# Patient Record
Sex: MALE | Race: WHITE
[De-identification: names, ages, dates, MRNs, and addresses within clinical notes are randomized per-mention and may not be internally consistent; named-entity substitution may affect disease eponyms.]

---

## 2017-01-25 ENCOUNTER — HOSPITAL ENCOUNTER (INPATIENT)
Age: 82
LOS: 2 days | Discharge: HOME | DRG: 637 | End: 2017-01-27
Payer: MEDICARE

## 2017-01-25 ENCOUNTER — HOSPITAL ENCOUNTER (OUTPATIENT)
Age: 82
Discharge: ANOTHER HOSPITAL | End: 2017-01-25
Payer: MEDICARE

## 2017-01-25 DIAGNOSIS — I48.91: ICD-10-CM

## 2017-01-25 DIAGNOSIS — I70.1: ICD-10-CM

## 2017-01-25 DIAGNOSIS — I10: ICD-10-CM

## 2017-01-25 DIAGNOSIS — E78.5: ICD-10-CM

## 2017-01-25 DIAGNOSIS — N28.9: ICD-10-CM

## 2017-01-25 DIAGNOSIS — G93.41: ICD-10-CM

## 2017-01-25 DIAGNOSIS — Z79.01: ICD-10-CM

## 2017-01-25 DIAGNOSIS — I48.2: ICD-10-CM

## 2017-01-25 DIAGNOSIS — Z66: ICD-10-CM

## 2017-01-25 DIAGNOSIS — Z95.828: ICD-10-CM

## 2017-01-25 DIAGNOSIS — Z72.0: ICD-10-CM

## 2017-01-25 DIAGNOSIS — J44.9: ICD-10-CM

## 2017-01-25 DIAGNOSIS — Z99.81: ICD-10-CM

## 2017-01-25 DIAGNOSIS — J98.4: ICD-10-CM

## 2017-01-25 DIAGNOSIS — G47.33: ICD-10-CM

## 2017-01-25 DIAGNOSIS — I25.10: ICD-10-CM

## 2017-01-25 DIAGNOSIS — I65.29: ICD-10-CM

## 2017-01-25 DIAGNOSIS — J96.10: ICD-10-CM

## 2017-01-25 DIAGNOSIS — Z91.81: ICD-10-CM

## 2017-01-25 DIAGNOSIS — Z71.6: ICD-10-CM

## 2017-01-25 DIAGNOSIS — N40.0: ICD-10-CM

## 2017-01-25 DIAGNOSIS — Z95.5: ICD-10-CM

## 2017-01-25 DIAGNOSIS — I73.9: ICD-10-CM

## 2017-01-25 DIAGNOSIS — I25.2: ICD-10-CM

## 2017-01-25 DIAGNOSIS — Z79.899: ICD-10-CM

## 2017-01-25 DIAGNOSIS — N17.9: ICD-10-CM

## 2017-01-25 DIAGNOSIS — E11.01: Primary | ICD-10-CM

## 2020-03-03 ENCOUNTER — HOSPITAL ENCOUNTER (OUTPATIENT)
Dept: HOSPITAL 76 - RT | Age: 85
Discharge: HOME | End: 2020-03-03
Attending: STUDENT IN AN ORGANIZED HEALTH CARE EDUCATION/TRAINING PROGRAM
Payer: MEDICARE

## 2020-03-03 DIAGNOSIS — F17.290: ICD-10-CM

## 2020-03-03 DIAGNOSIS — J44.9: Primary | ICD-10-CM

## 2022-05-09 ENCOUNTER — HOSPITAL ENCOUNTER (INPATIENT)
Dept: HOSPITAL 76 - ED | Age: 87
LOS: 3 days | Discharge: HOME HEALTH | DRG: 177 | End: 2022-05-12
Attending: INTERNAL MEDICINE | Admitting: INTERNAL MEDICINE
Payer: MEDICARE

## 2022-05-09 ENCOUNTER — HOSPITAL ENCOUNTER (OUTPATIENT)
Dept: HOSPITAL 76 - EMS | Age: 87
Discharge: ANOTHER HOSPITAL | End: 2022-05-09
Payer: MEDICARE

## 2022-05-09 DIAGNOSIS — E78.5: ICD-10-CM

## 2022-05-09 DIAGNOSIS — N40.0: ICD-10-CM

## 2022-05-09 DIAGNOSIS — T44.7X6A: ICD-10-CM

## 2022-05-09 DIAGNOSIS — Z79.4: ICD-10-CM

## 2022-05-09 DIAGNOSIS — I11.0: ICD-10-CM

## 2022-05-09 DIAGNOSIS — Z95.5: ICD-10-CM

## 2022-05-09 DIAGNOSIS — R41.89: ICD-10-CM

## 2022-05-09 DIAGNOSIS — R63.0: ICD-10-CM

## 2022-05-09 DIAGNOSIS — R53.1: Primary | ICD-10-CM

## 2022-05-09 DIAGNOSIS — Z91.128: ICD-10-CM

## 2022-05-09 DIAGNOSIS — E87.6: ICD-10-CM

## 2022-05-09 DIAGNOSIS — I48.91: Primary | ICD-10-CM

## 2022-05-09 DIAGNOSIS — I48.20: ICD-10-CM

## 2022-05-09 DIAGNOSIS — Z99.81: ICD-10-CM

## 2022-05-09 DIAGNOSIS — E11.51: ICD-10-CM

## 2022-05-09 DIAGNOSIS — Z79.01: ICD-10-CM

## 2022-05-09 DIAGNOSIS — E83.42: ICD-10-CM

## 2022-05-09 DIAGNOSIS — R60.0: ICD-10-CM

## 2022-05-09 DIAGNOSIS — R53.83: ICD-10-CM

## 2022-05-09 DIAGNOSIS — I25.10: ICD-10-CM

## 2022-05-09 DIAGNOSIS — I50.9: ICD-10-CM

## 2022-05-09 DIAGNOSIS — F03.90: ICD-10-CM

## 2022-05-09 DIAGNOSIS — Z28.310: ICD-10-CM

## 2022-05-09 DIAGNOSIS — I65.29: ICD-10-CM

## 2022-05-09 DIAGNOSIS — R09.02: ICD-10-CM

## 2022-05-09 DIAGNOSIS — F17.210: ICD-10-CM

## 2022-05-09 DIAGNOSIS — J44.1: ICD-10-CM

## 2022-05-09 DIAGNOSIS — K21.9: ICD-10-CM

## 2022-05-09 DIAGNOSIS — I13.0: ICD-10-CM

## 2022-05-09 DIAGNOSIS — N17.9: ICD-10-CM

## 2022-05-09 DIAGNOSIS — M10.9: ICD-10-CM

## 2022-05-09 DIAGNOSIS — J12.82: ICD-10-CM

## 2022-05-09 DIAGNOSIS — R94.6: ICD-10-CM

## 2022-05-09 DIAGNOSIS — R05.9: ICD-10-CM

## 2022-05-09 DIAGNOSIS — G47.33: ICD-10-CM

## 2022-05-09 DIAGNOSIS — N18.9: ICD-10-CM

## 2022-05-09 DIAGNOSIS — U07.1: ICD-10-CM

## 2022-05-09 DIAGNOSIS — Z66: ICD-10-CM

## 2022-05-09 DIAGNOSIS — J44.0: ICD-10-CM

## 2022-05-09 DIAGNOSIS — I25.2: ICD-10-CM

## 2022-05-09 DIAGNOSIS — J18.9: ICD-10-CM

## 2022-05-09 DIAGNOSIS — G47.30: ICD-10-CM

## 2022-05-09 DIAGNOSIS — R06.09: ICD-10-CM

## 2022-05-09 DIAGNOSIS — E11.22: ICD-10-CM

## 2022-05-09 LAB
ALBUMIN DIAFP-MCNC: 2.7 G/DL (ref 3.2–5.5)
ALBUMIN/GLOB SERPL: 0.6 {RATIO} (ref 1–2.2)
ALP SERPL-CCNC: 178 IU/L (ref 42–121)
ALT SERPL W P-5'-P-CCNC: 20 IU/L (ref 10–60)
ANION GAP SERPL CALCULATED.4IONS-SCNC: 12 MMOL/L (ref 6–13)
AST SERPL W P-5'-P-CCNC: 22 IU/L (ref 10–42)
B PARAPERT DNA SPEC QL NAA+PROBE: NOT DETECTED
B PERT DNA SPEC QL NAA+PROBE: NOT DETECTED
BASE EXCESS BLDV CALC-SCNC: 7 MMOL/L
BASOPHILS NFR BLD AUTO: 0 10^3/UL (ref 0–0.1)
BASOPHILS NFR BLD AUTO: 0.3 %
BILIRUB BLD-MCNC: 1.4 MG/DL (ref 0.2–1)
BUN SERPL-MCNC: 17 MG/DL (ref 6–20)
C PNEUM DNA NPH QL NAA+NON-PROBE: NOT DETECTED
CALCIUM UR-MCNC: 8.6 MG/DL (ref 8.5–10.3)
CHLORIDE SERPL-SCNC: 97 MMOL/L (ref 101–111)
CO2 BLDV-SCNC: 35.3 MMOL/L (ref 24–29)
CO2 SERPL-SCNC: 31 MMOL/L (ref 21–32)
CREAT SERPLBLD-SCNC: 1.6 MG/DL (ref 0.6–1.2)
DIGOXIN SERPL-MCNC: < 0.2 NG/ML
EOSINOPHIL # BLD AUTO: 0 10^3/UL (ref 0–0.7)
EOSINOPHIL NFR BLD AUTO: 0.3 %
ERYTHROCYTE [DISTWIDTH] IN BLOOD BY AUTOMATED COUNT: 13.8 % (ref 12–15)
FLUAV RNA RESP QL NAA+PROBE: NOT DETECTED
GFRSERPLBLD MDRD-ARVRAT: 41 ML/MIN/{1.73_M2} (ref 89–?)
GLOBULIN SER-MCNC: 4.2 G/DL (ref 2.1–4.2)
GLUCOSE SERPL-MCNC: 117 MG/DL (ref 70–100)
HAEM INFLU B DNA SPEC QL NAA+PROBE: NOT DETECTED
HCO3 BLDMV-SCNC: 33.6 MMOL/L (ref 23–28)
HCOV 229E RNA SPEC QL NAA+PROBE: NOT DETECTED
HCOV HKU1 RNA UPPER RESP QL NAA+PROBE: NOT DETECTED
HCOV NL63 RNA ASPIRATE QL NAA+PROBE: NOT DETECTED
HCOV OC43 RNA SPEC QL NAA+PROBE: NOT DETECTED
HCT VFR BLD AUTO: 42.6 % (ref 42–52)
HGB UR QL STRIP: 13.9 G/DL (ref 14–18)
HMPV AG SPEC QL: NOT DETECTED
HPIV1 RNA NPH QL NAA+PROBE: NOT DETECTED
HPIV2 SPEC QL CULT: NOT DETECTED
HPIV3 AB TITR SER CF: NOT DETECTED {TITER}
HPIV4 RNA SPEC QL NAA+PROBE: NOT DETECTED
INR PPP: 1.2 (ref 0.8–1.2)
LYMPHOCYTES # SPEC AUTO: 0.5 10^3/UL (ref 1.5–3.5)
LYMPHOCYTES NFR BLD AUTO: 7.2 %
M PNEUMO DNA SPEC QL NAA+PROBE: NOT DETECTED
MAGNESIUM SERPL-MCNC: 1.4 MG/DL (ref 1.7–2.8)
MCH RBC QN AUTO: 30.5 PG (ref 27–31)
MCHC RBC AUTO-ENTMCNC: 32.6 G/DL (ref 32–36)
MCV RBC AUTO: 93.4 FL (ref 80–94)
MONOCYTES # BLD AUTO: 0.6 10^3/UL (ref 0–1)
MONOCYTES NFR BLD AUTO: 8.6 %
NEUTROPHILS # BLD AUTO: 6.2 10^3/UL (ref 1.5–6.6)
NEUTROPHILS # SNV AUTO: 7.4 X10^3/UL (ref 4.8–10.8)
NEUTROPHILS NFR BLD AUTO: 83.2 %
NRBC # BLD AUTO: 0 /100WBC
NRBC # BLD AUTO: 0 X10^3/UL
PCO2 BLDV: 55.6 MMHG (ref 41–51)
PDW BLD AUTO: 10.7 FL (ref 7.4–11.4)
PH BLDV: 7.4 [PH] (ref 7.31–7.41)
PHOSPHATE BLD-MCNC: 2.9 MG/DL (ref 2.5–4.6)
PLATELET # BLD: 238 10^3/UL (ref 130–450)
PO2 BLDV: 19.9 MMHG (ref 25–47)
POTASSIUM SERPL-SCNC: 3.2 MMOL/L (ref 3.5–5)
PROT SPEC-MCNC: 6.9 G/DL (ref 6.7–8.2)
PROTHROM ACT/NOR PPP: 13.8 SECS (ref 9.9–12.6)
RBC MAR: 4.56 10^6/UL (ref 4.7–6.1)
RSV RNA RESP QL NAA+PROBE: NOT DETECTED
RV+EV RNA SPEC QL NAA+PROBE: NOT DETECTED
SAO2 DF BLDV: 34 % (ref 60–80)
SARS-COV-2 RNA PNL SPEC NAA+PROBE: DETECTED
SODIUM SERPLBLD-SCNC: 140 MMOL/L (ref 135–145)

## 2022-05-09 PROCEDURE — 87150 DNA/RNA AMPLIFIED PROBE: CPT

## 2022-05-09 PROCEDURE — 82803 BLOOD GASES ANY COMBINATION: CPT

## 2022-05-09 PROCEDURE — 82330 ASSAY OF CALCIUM: CPT

## 2022-05-09 PROCEDURE — 84100 ASSAY OF PHOSPHORUS: CPT

## 2022-05-09 PROCEDURE — 71275 CT ANGIOGRAPHY CHEST: CPT

## 2022-05-09 PROCEDURE — 94640 AIRWAY INHALATION TREATMENT: CPT

## 2022-05-09 PROCEDURE — 71045 X-RAY EXAM CHEST 1 VIEW: CPT

## 2022-05-09 PROCEDURE — 87633 RESP VIRUS 12-25 TARGETS: CPT

## 2022-05-09 PROCEDURE — 82746 ASSAY OF FOLIC ACID SERUM: CPT

## 2022-05-09 PROCEDURE — 83605 ASSAY OF LACTIC ACID: CPT

## 2022-05-09 PROCEDURE — 80048 BASIC METABOLIC PNL TOTAL CA: CPT

## 2022-05-09 PROCEDURE — 82607 VITAMIN B-12: CPT

## 2022-05-09 PROCEDURE — 83735 ASSAY OF MAGNESIUM: CPT

## 2022-05-09 PROCEDURE — 84480 ASSAY TRIIODOTHYRONINE (T3): CPT

## 2022-05-09 PROCEDURE — 83036 HEMOGLOBIN GLYCOSYLATED A1C: CPT

## 2022-05-09 PROCEDURE — 84439 ASSAY OF FREE THYROXINE: CPT

## 2022-05-09 PROCEDURE — 84443 ASSAY THYROID STIM HORMONE: CPT

## 2022-05-09 PROCEDURE — 97165 OT EVAL LOW COMPLEX 30 MIN: CPT

## 2022-05-09 PROCEDURE — 36415 COLL VENOUS BLD VENIPUNCTURE: CPT

## 2022-05-09 PROCEDURE — 85610 PROTHROMBIN TIME: CPT

## 2022-05-09 PROCEDURE — 93005 ELECTROCARDIOGRAM TRACING: CPT

## 2022-05-09 PROCEDURE — 96376 TX/PRO/DX INJ SAME DRUG ADON: CPT

## 2022-05-09 PROCEDURE — 97161 PT EVAL LOW COMPLEX 20 MIN: CPT

## 2022-05-09 PROCEDURE — 83880 ASSAY OF NATRIURETIC PEPTIDE: CPT

## 2022-05-09 PROCEDURE — 84484 ASSAY OF TROPONIN QUANT: CPT

## 2022-05-09 PROCEDURE — 99285 EMERGENCY DEPT VISIT HI MDM: CPT

## 2022-05-09 PROCEDURE — 85025 COMPLETE CBC W/AUTO DIFF WBC: CPT

## 2022-05-09 PROCEDURE — 94761 N-INVAS EAR/PLS OXIMETRY MLT: CPT

## 2022-05-09 PROCEDURE — 87040 BLOOD CULTURE FOR BACTERIA: CPT

## 2022-05-09 PROCEDURE — 80162 ASSAY OF DIGOXIN TOTAL: CPT

## 2022-05-09 PROCEDURE — 99291 CRITICAL CARE FIRST HOUR: CPT

## 2022-05-09 PROCEDURE — 93971 EXTREMITY STUDY: CPT

## 2022-05-09 PROCEDURE — 94664 DEMO&/EVAL PT USE INHALER: CPT

## 2022-05-09 PROCEDURE — 96365 THER/PROPH/DIAG IV INF INIT: CPT

## 2022-05-09 PROCEDURE — 96375 TX/PRO/DX INJ NEW DRUG ADDON: CPT

## 2022-05-09 PROCEDURE — 80053 COMPREHEN METABOLIC PANEL: CPT

## 2022-05-09 RX ADMIN — SODIUM CHLORIDE, PRESERVATIVE FREE PRN ML: 5 INJECTION INTRAVENOUS at 21:54

## 2022-05-09 RX ADMIN — LEVALBUTEROL SCH MG: 1.25 SOLUTION RESPIRATORY (INHALATION) at 22:40

## 2022-05-09 RX ADMIN — INSULIN ASPART SCH UNIT: 100 INJECTION, SOLUTION INTRAVENOUS; SUBCUTANEOUS at 22:15

## 2022-05-09 RX ADMIN — POTASSIUM CHLORIDE SCH MLS/HR: 7.46 INJECTION, SOLUTION INTRAVENOUS at 23:24

## 2022-05-09 RX ADMIN — FAMOTIDINE SCH MG: 20 TABLET, FILM COATED ORAL at 21:53

## 2022-05-09 RX ADMIN — METOPROLOL SUCCINATE SCH MG: 25 TABLET, EXTENDED RELEASE ORAL at 21:53

## 2022-05-09 NOTE — ULTRASOUND REPORT
PROCEDURE:  Duplex Ext Veins Right

 

INDICATIONS:  Leg swelling

 

TECHNIQUE:  

Real-time imaging, as well as color and pulse Doppler interrogation, were performed of the lower extr
emity deep veins from the inguinal ligament to the popliteal fossa.  

 

COMPARISON:  None.

 

FINDINGS:  The deep veins are normally compressible, and free of intraluminal thrombus.  Color and pu
lse Doppler demonstrate normal phasic intraluminal flow.  There is normal augmentation response to di
stal compression maneuver.  

 

IMPRESSION:  No sonographic evidence of DVT.

 

Reviewed by: Murphy Ramirez MD on 5/9/2022 7:23 PM PDT

Approved by: Murphy Ramirez MD on 5/9/2022 7:23 PM PDT

 

 

Station ID:  [id number]

## 2022-05-09 NOTE — XRAY REPORT
PROCEDURE:  Chest 1 View X-Ray

 

INDICATIONS:  dyspnea

 

TECHNIQUE:  One view of the chest was acquired.  

 

COMPARISON:  Chest x-ray one view, 1/25/2017.

 

FINDINGS:  

 

Surgical changes and devices:  None.  

 

Lungs and pleura:  Left basilar infiltrate and consolidation. Small left effusion is present. A densi
ty in the right upper lobe is likely caused by costochondral calcification of the first rib. No pneum
othorax.

 

Mediastinum:  Mediastinal contours appear normal.  Heart size is normal.  

 

Bones and chest wall:  No suspicious bony lesions.  Overlying soft tissues appear unremarkable.  

 

IMPRESSION:  

 

Left lower lobe infiltrate/consolidation with small left pleural effusions suspicious for left lower 
lobe pneumonia.

 

Reviewed by: Deanne Choi MD on 5/9/2022 5:39 PM [facility]

Approved by: Deanne Choi MD on 5/9/2022 5:39 PM [facility]

 

 

Station ID:  [id number]

## 2022-05-09 NOTE — ED PHYSICIAN DOCUMENTATION
History of Present Illness





- Stated complaint


Stated Complaint: SOA





- History obtained from


History obtained from: Patient, EMS





- Additonal information


Additional information: 





86-year-old gentleman presents by ambulance for the evaluation of shortness of 

breath.  He is a vague historian, says he is short of breath, just been coming 

and going over maybe days.  Says he has a mild cough, no chest pain.  Paramedics

state he has been noncompliant with his medications.  Patient certainly does not

know his medications but he says he takes about 4 in the morning and about 5 in 

the evening.  He usually uses oxygen at night for his COPD and when I mentioned 

Trelegy he unconvincingly stated that he had been using that as well.  On exam I

note that his right leg is swollen, he had not noted that.  He says he is 

smoking less lately than he used to, currently only about 1-1/2 packs a day.  He

has a history of A. fib, coronary disease, peripheral vascular disease, COPD, 

hyperlipidemia, hypertension, BPH, gout, and remote cholecystectomy.





Review of Systems


Ten Systems: 10 systems reviewed and negative


Constitutional: denies: Fever, Chills


Nose: denies: Rhinorrhea / runny nose, Congestion


Cardiac: denies: Chest pain / pressure, Palpitations


Respiratory: reports: Dyspnea, Cough





PD PAST MEDICAL HISTORY





- Past Medical History


Cardiovascular: Congestive heart failure, Hypertension, Coronary artery disease,

Peripheral Vascular Disease, MI, Atrial fibrillation


Respiratory: COPD, Sleep apnea, CPAP use


Endocrine/Autoimmune: Type 2 diabetes


GI: GERD, Ulcers, Hemorrhoids


: Incontinence


HEENT: Chronic hearing loss


Psych: None


Musculoskeletal: Other


Derm: None





- Past Surgical History


Past Surgical History: Yes


General: Cholecystectomy, Other


Cardiovascular: Coronary stent





- Present Medications


Home Medications: 


                                Ambulatory Orders











 Medication  Instructions  Recorded  Confirmed


 


Colchicine [Colcrys] 0.6 mg PO DAILY 01/25/17 01/26/17


 


Digoxin [Digox] 125 mcg PO DAILY 01/25/17 01/26/17


 


Doxazosin [Cardura] 4 mg PO DAILY 01/25/17 01/26/17


 


Esomeprazole Magnesium [Nexium] 20 mg PO DAILY 01/25/17 01/26/17


 


Furosemide 40 mg PO SUMOWETHSA 01/25/17 01/26/17


 


Metoprolol Succinate [Toprol Xl] 25 mg PO DAILY 01/25/17 01/26/17


 


Potassium Chloride [Klor-Con M20] 20 meq PO DAILY 01/25/17 01/26/17


 


Simvastatin 40 mg PO DAILY 01/25/17 01/26/17


 


Warfarin Sodium 5 mg PO MOTUWEFRSA 01/25/17 01/26/17


 


allopurinoL [Allopurinol] 300 mg PO DAILY 01/25/17 01/26/17


 


lisinopriL [Zestril] 20 mg PO DAILY 01/25/17 01/26/17


 


Furosemide [Lasix] 80 mg PO TUTH 01/26/17 01/26/17


 


Warfarin Sodium 7.5 mg PO SUTH 01/26/17 01/26/17


 


Blood Sugar Diagnostic [Glu[facility]eter [facility] strip 01/27/17 





Strips]   


 


Blood-Glucose Meter [Glucometer] 1 each [facility] #1 kit 01/27/17 


 


Insulin Aspart [NovoLOG] 5 unit SUBQ TIDWM #2 pen 01/27/17 


 


Insulin Glargine [Lantus Solostar] 25 unit SUBQ QDBREAKFAST #2 pen 01/27/17 


 


Needles, Disposable [Easy Touch 1 each [facility] dis.needle 01/27/17 





Hypodermic Needle]   














- Allergies


Allergies/Adverse Reactions: 


                                    Allergies











Allergy/AdvReac Type Severity Reaction Status Date / Time


 


ibuprofen Allergy  Hives Verified 05/09/22 18:20














- Social History


Does the pt smoke?: Yes


Smoking Status: Current every day smoker


Does the pt drink ETOH?: No


Does the pt have substance abuse?: No





- Immunizations


Immunizations: TDAP >10years/unknown





PD ED PE NORMAL





- Vitals


Vital signs reviewed: Yes





- General


General: Alert and oriented X 3, Other (Rapid pursed lip breathing, speaking in 

full sentences albeit barely.  Smells of tobacco.)





- HEENT


HEENT: PERRL, EOMI





- Neck


Neck: Supple, no meningeal sign, No bony TTP





- Cardiac


Cardiac: Other (Rapid and irregular without murmur)





- Respiratory


Respiratory: Other (Rapid breathing, diminished throughout)





- Abdomen


Abdomen: Normal bowel sounds, Soft, Non tender





- Back


Back: No CVA TTP, No spinal TTP





- Derm


Derm: Normal color, Warm and dry





- Extremities


Extremities: Other (Significant asymmetric edema of the right leg without 

tenderness)





- Neuro


Neuro: Alert and oriented X 3, Normal speech





Results





- Vitals


Vitals: 


                               Vital Signs - 24 hr











  05/09/22 05/09/22 05/09/22





  18:11 18:20 19:12


 


Temperature 37.3 C  


 


Heart Rate 119 H 111 H 107 H


 


Respiratory 28 H 27 H 28 H





Rate   


 


Blood Pressure 151/105 H 160/104 H 160/104 H


 


O2 Saturation 93 93 92














  05/09/22





  19:30


 


Temperature 


 


Heart Rate 106 H


 


Respiratory 24





Rate 


 


Blood Pressure 


 


O2 Saturation 








                                     Oxygen











O2 Source                      Nasal cannula


 


Oxygen Flow Rate               2

















- EKG (time done)


  ** 1910


Rate: Rate (enter#) (110)


Rhythm: Atrial fibrillation


Axis: Normal


Intervals: Prolonged QT


Ischemia: Non specific changes





- Labs


Labs: 


                                Laboratory Tests











  05/09/22 05/09/22 05/09/22





  18:26 18:26 18:26


 


WBC  7.4  


 


RBC  4.56 L  


 


Hgb  13.9 L  


 


Hct  42.6  


 


MCV  93.4  


 


MCH  30.5  


 


MCHC  32.6  


 


RDW  13.8  


 


Plt Count  238  


 


MPV  10.7  


 


Neut # (Auto)  6.2  


 


Lymph # (Auto)  0.5 L  


 


Mono # (Auto)  0.6  


 


Eos # (Auto)  0.0  


 


Baso # (Auto)  0.0  


 


Absolute Nucleated RBC  0.00  


 


Nucleated RBC %  0.0  


 


VBG pH   


 


VBG pCO2   


 


VBG pO2   


 


VBG HCO3   


 


VBG Total CO2   


 


VBG O2 Saturation   


 


VBG Base Excess   


 


Sodium   140 


 


Potassium   3.2 L 


 


Chloride   97 L 


 


Carbon Dioxide   31 


 


Anion Gap   12.0 


 


BUN   17 


 


Creatinine   1.6 H 


 


Estimated GFR (MDRD)   41 L 


 


Glucose   117 H 


 


Lactic Acid   


 


Calcium   8.6 


 


Phosphorus   2.9 


 


Magnesium   1.4 L 


 


Total Bilirubin   1.4 H 


 


AST   22 


 


ALT   20 


 


Alkaline Phosphatase   178 H 


 


Troponin I High Sens    47.7 H*


 


B-Natriuretic Peptide   


 


Total Protein   6.9 


 


Albumin   2.7 L 


 


Globulin   4.2 


 


Albumin/Globulin Ratio   0.6 L 


 


Nasal Adenovirus (PCR)   


 


Nasal B. parapertussis DNA (PCR)   


 


Nasal Coronavir 229E PCR   


 


Nasal Coronavir HKU1 PCR   


 


Nasal Coronavir NL63 PCR   


 


Nasal Coronavir OC43 PCR   


 


Nasal Enterovir/Rhinovir PCR   


 


Nasal Influenza B PCR   


 


Nasal Influenza A PCR   


 


Nasal Parainfluen 1 PCR   


 


Nasal Parainfluen 2 PCR   


 


Nasal Parainfluen 3 PCR   


 


Nasal Parainfluen 4 PCR   


 


Nasal RSV (PCR)   


 


Nasal B.pertussis DNA PCR   


 


Nasal C.pneumoniae (PCR)   


 


Ger Human Metapneumo PCR   


 


Nasal M.pneumoniae (PCR)   


 


Nasal SARS-CoV-2 (PCR)   


 


Last Dose Date   


 


Last Dose Time   


 


Digoxin   














  05/09/22 05/09/22 05/09/22





  18:26 18:26 18:26


 


WBC   


 


RBC   


 


Hgb   


 


Hct   


 


MCV   


 


MCH   


 


MCHC   


 


RDW   


 


Plt Count   


 


MPV   


 


Neut # (Auto)   


 


Lymph # (Auto)   


 


Mono # (Auto)   


 


Eos # (Auto)   


 


Baso # (Auto)   


 


Absolute Nucleated RBC   


 


Nucleated RBC %   


 


VBG pH    7.399


 


VBG pCO2    55.6 H


 


VBG pO2    19.9 L


 


VBG HCO3    33.6 H


 


VBG Total CO2    35.3 H


 


VBG O2 Saturation    34.0 L


 


VBG Base Excess    7.0 H


 


Sodium   


 


Potassium   


 


Chloride   


 


Carbon Dioxide   


 


Anion Gap   


 


BUN   


 


Creatinine   


 


Estimated GFR (MDRD)   


 


Glucose   


 


Lactic Acid   1.7 


 


Calcium   


 


Phosphorus   


 


Magnesium   


 


Total Bilirubin   


 


AST   


 


ALT   


 


Alkaline Phosphatase   


 


Troponin I High Sens   


 


B-Natriuretic Peptide  1928 H  


 


Total Protein   


 


Albumin   


 


Globulin   


 


Albumin/Globulin Ratio   


 


Nasal Adenovirus (PCR)   


 


Nasal B. parapertussis DNA (PCR)   


 


Nasal Coronavir 229E PCR   


 


Nasal Coronavir HKU1 PCR   


 


Nasal Coronavir NL63 PCR   


 


Nasal Coronavir OC43 PCR   


 


Nasal Enterovir/Rhinovir PCR   


 


Nasal Influenza B PCR   


 


Nasal Influenza A PCR   


 


Nasal Parainfluen 1 PCR   


 


Nasal Parainfluen 2 PCR   


 


Nasal Parainfluen 3 PCR   


 


Nasal Parainfluen 4 PCR   


 


Nasal RSV (PCR)   


 


Nasal B.pertussis DNA PCR   


 


Nasal C.pneumoniae (PCR)   


 


Ger Human Metapneumo PCR   


 


Nasal M.pneumoniae (PCR)   


 


Nasal SARS-CoV-2 (PCR)   


 


Last Dose Date   


 


Last Dose Time   


 


Digoxin   














  05/09/22 05/09/22





  18:26 18:30


 


WBC  


 


RBC  


 


Hgb  


 


Hct  


 


MCV  


 


MCH  


 


MCHC  


 


RDW  


 


Plt Count  


 


MPV  


 


Neut # (Auto)  


 


Lymph # (Auto)  


 


Mono # (Auto)  


 


Eos # (Auto)  


 


Baso # (Auto)  


 


Absolute Nucleated RBC  


 


Nucleated RBC %  


 


VBG pH  


 


VBG pCO2  


 


VBG pO2  


 


VBG HCO3  


 


VBG Total CO2  


 


VBG O2 Saturation  


 


VBG Base Excess  


 


Sodium  


 


Potassium  


 


Chloride  


 


Carbon Dioxide  


 


Anion Gap  


 


BUN  


 


Creatinine  


 


Estimated GFR (MDRD)  


 


Glucose  


 


Lactic Acid  


 


Calcium  


 


Phosphorus  


 


Magnesium  


 


Total Bilirubin  


 


AST  


 


ALT  


 


Alkaline Phosphatase  


 


Troponin I High Sens  


 


B-Natriuretic Peptide  


 


Total Protein  


 


Albumin  


 


Globulin  


 


Albumin/Globulin Ratio  


 


Nasal Adenovirus (PCR)   NOT DETECTED


 


Nasal B. parapertussis DNA (PCR)   NOT DETECTED


 


Nasal Coronavir 229E PCR   NOT DETECTED


 


Nasal Coronavir HKU1 PCR   NOT DETECTED


 


Nasal Coronavir NL63 PCR   NOT DETECTED


 


Nasal Coronavir OC43 PCR   NOT DETECTED


 


Nasal Enterovir/Rhinovir PCR   NOT DETECTED


 


Nasal Influenza B PCR   NOT DETECTED


 


Nasal Influenza A PCR   NOT DETECTED


 


Nasal Parainfluen 1 PCR   NOT DETECTED


 


Nasal Parainfluen 2 PCR   NOT DETECTED


 


Nasal Parainfluen 3 PCR   NOT DETECTED


 


Nasal Parainfluen 4 PCR   NOT DETECTED


 


Nasal RSV (PCR)   NOT DETECTED


 


Nasal B.pertussis DNA PCR   NOT DETECTED


 


Nasal C.pneumoniae (PCR)   NOT DETECTED


 


Ger Human Metapneumo PCR   NOT DETECTED


 


Nasal M.pneumoniae (PCR)   NOT DETECTED


 


Nasal SARS-CoV-2 (PCR)   DETECTED A


 


Last Dose Date  UNKNOWN 


 


Last Dose Time  UNKNOWN 


 


Digoxin  < 0.2 














PD MEDICAL DECISION MAKING





- ED course


ED course: 





86-year-old gentleman presents by ambulance for shortness of breath and is found

to have left lower lobe pneumonia, COVID, and A. fib RVR.  He was treated here 

with divided doses of diltiazem and breathing treatments.  He also got Rocephin 

and azithromycin after blood cultures.  I spoke with Dr. Haynes for admission

at 7:49 PM.


We did discuss goals of care and CODE STATUS.  He states he is not ready to 

think about it so presume he is full code for now but he is encouraged to think 

about it.





- Critical Care


Time(min): 42


Time Includes: Direct patient care, Review records, Reassess patient, Document 

care, Coordinate care, Medical consult


Data interpretation: Labs, Pulse ox


Procedures excluded from critical care time: EKG





Departure





- Departure


Disposition: [facility] DC/Xfer


Clinical Impression: 


 CAD (coronary artery disease), Atrial fibrillation with RVR, Tobacco abuse, 

Left lower lobe pneumonia, COPD exacerbation, COVID-19





Condition: Serious

## 2022-05-09 NOTE — CT REPORT
PROCEDURE:  ANGIO CHEST W/WO

 

INDICATIONS:  dyspnea, pe protocol

 

CONTRAST:  IV CONTRAST: Isovue 300 ml: 100 PO CONTRAST: *NO PO CONTRAST  

 

TECHNIQUE:  

After the administration of intravenous contrast, 2 mm axial images were acquired from the pulmonary 
apices to the posterior costophrenic angles during the arterial phase. In addition, 1 mm lung kernel 
and 5 mm soft tissue kernel reconstructions were performed. 3-dimensional coronal oblique maximum int
ensity projection (MIP) reformats, 8 mm axial MIP, and 5 mm coronal and sagittal MPR reformats were t
hen performed through the thorax. For radiation dose reduction, the following was used: automated exp
osure control, adjustment of mA and/or kV according to patient size.

 

COMPARISON:  Chest x-ray 5/9/2022

 

FINDINGS:  

Image quality: There is motion artifact limiting evaluation.

 

Pulmonary arteries:  Pulmonary arteries demonstrate no intraluminal filling defects to suggest centra
l pulmonary embolism to the level of the segmental pulmonary arteries. Evaluation of subsegmental bra
nches is limited due to suboptimal contrast opacification and motion artifact. There is enlargement o
f the pulmonary arteries, with the main pulmonary artery measuring up to 3.2 cm suggestive of pulmona
ry arterial hypertension.

 

Lungs and pleura:  There is a small left pleural effusion with associated compressive atelectasis. Th
ere is also dependent atelectasis in the right lung. In addition, there are confluent ground glass op
acities inferiorly within the right lower lobe with associated septal thickening. Small clustered nod
ular opacities are also demonstrated inferiorly within the right middle lobe and left lingula. The fi
ndings are suggestive of pneumonia or sequelae of aspiration. The superior segment of the right lower
 lobe, there is a pulmonary nodule measuring up to 0.7 cm on series 6 image 105.

 

Mediastinum:  Heart size is enlarged, without pericardial effusion.  Thoracic aorta is normal in steffanie
herbie and enhancement.  There are a few mildly enlarged mediastinal lymph nodes including a right parat
marija node measuring up to 1.2 cm in short axis. Esophagus is normal in caliber, without hiatal her
edna.  

 

Bones and chest wall:  No suspicious bony lesions.  Ribs and thoracic spine appear intact throughout.
  No axillary or supraclavicular adenopathy.  The visualized thyroid demonstrates no discrete nodules
.

 

Abdomen:  Visualized upper abdomen demonstrates thickening of the adrenal glands bilaterally. There i
s reflux of contrast into the inferior vena cava suggestive of elevated right heart filling pressures
.

 

IMPRESSION:  

 

1. No evidence of central pulmonary embolism to the level of the segmental pulmonary arteries. Evalua
tion of subsegmental branches is limited by motion artifact and suboptimal contrast opacification.

 

2. Confluent groundglass opacities with septal thickening in the right lower lobe. Clustered indistin
ct nodular opacities also demonstrated in the inferior right middle lobe and left lingula. The findin
gs are nonspecific but suggestive of pneumonia. Differential also includes sequelae of aspiration giv
en the presence of dependent mucus in the trachea and right mainstem bronchus.

 

3. Small left pleural effusion with compressive atelectasis.

 

4. Mildly enlarged mediastinal lymph nodes are nonspecific but likely reactive.

 

5. Right lower lobe pulmonary nodule measuring up to 0.7 cm. Recommend follow-up CT in 6 months to de
monstrate stability.

 

Reviewed by: Wing Lyons MD on 5/9/2022 10:28 PM PDT

Approved by: Wing Lyons MD on 5/9/2022 10:28 PM PDT

 

 

Station ID:  [id number]

## 2022-05-10 LAB
ANION GAP SERPL CALCULATED.4IONS-SCNC: 12 MMOL/L (ref 6–13)
BASOPHILS NFR BLD AUTO: 0 10^3/UL (ref 0–0.1)
BASOPHILS NFR BLD AUTO: 0.1 %
BUN SERPL-MCNC: 17 MG/DL (ref 6–20)
CA-I SERPL ISE-MCNC: 1.09 MMOL/L (ref 1.15–1.33)
CALCIUM UR-MCNC: 8.6 MG/DL (ref 8.5–10.3)
CHLORIDE SERPL-SCNC: 102 MMOL/L (ref 101–111)
CO2 SERPL-SCNC: 30 MMOL/L (ref 21–32)
CREAT SERPLBLD-SCNC: 1.4 MG/DL (ref 0.6–1.2)
EOSINOPHIL # BLD AUTO: 0 10^3/UL (ref 0–0.7)
EOSINOPHIL NFR BLD AUTO: 0 %
ERYTHROCYTE [DISTWIDTH] IN BLOOD BY AUTOMATED COUNT: 13.6 % (ref 12–15)
EST. AVERAGE GLUCOSE BLD GHB EST-MCNC: 140 MG/DL (ref 70–100)
GFRSERPLBLD MDRD-ARVRAT: 48 ML/MIN/{1.73_M2} (ref 89–?)
GLUCOSE SERPL-MCNC: 170 MG/DL (ref 70–100)
HBA1C MFR BLD HPLC: 6.5 % (ref 4.27–6.07)
HCT VFR BLD AUTO: 40.5 % (ref 42–52)
HGB UR QL STRIP: 13.5 G/DL (ref 14–18)
INR PPP: 1.2 (ref 0.8–1.2)
LYMPHOCYTES # SPEC AUTO: 0.3 10^3/UL (ref 1.5–3.5)
LYMPHOCYTES NFR BLD AUTO: 4.5 %
MAGNESIUM SERPL-MCNC: 1.6 MG/DL (ref 1.7–2.8)
MCH RBC QN AUTO: 31 PG (ref 27–31)
MCHC RBC AUTO-ENTMCNC: 33.3 G/DL (ref 32–36)
MCV RBC AUTO: 93.1 FL (ref 80–94)
MONOCYTES # BLD AUTO: 0.2 10^3/UL (ref 0–1)
MONOCYTES NFR BLD AUTO: 2.9 %
NEUTROPHILS # BLD AUTO: 6.7 10^3/UL (ref 1.5–6.6)
NEUTROPHILS # SNV AUTO: 7.3 X10^3/UL (ref 4.8–10.8)
NEUTROPHILS NFR BLD AUTO: 92.1 %
NRBC # BLD AUTO: 0 /100WBC
NRBC # BLD AUTO: 0 X10^3/UL
PDW BLD AUTO: 11.5 FL (ref 7.4–11.4)
PH BLDV: 7.34 [PH] (ref 7.31–7.41)
PHOSPHATE BLD-MCNC: 3.8 MG/DL (ref 2.5–4.6)
PLATELET # BLD: 240 10^3/UL (ref 130–450)
POTASSIUM SERPL-SCNC: 3.7 MMOL/L (ref 3.5–5)
PROTHROM ACT/NOR PPP: 13.6 SECS (ref 9.9–12.6)
RBC MAR: 4.35 10^6/UL (ref 4.7–6.1)
SODIUM SERPLBLD-SCNC: 144 MMOL/L (ref 135–145)

## 2022-05-10 PROCEDURE — 3E0333Z INTRODUCTION OF ANTI-INFLAMMATORY INTO PERIPHERAL VEIN, PERCUTANEOUS APPROACH: ICD-10-PCS | Performed by: INTERNAL MEDICINE

## 2022-05-10 RX ADMIN — CALCIUM CARBONATE (ANTACID) CHEW TAB 500 MG SCH MG: 500 CHEW TAB at 06:07

## 2022-05-10 RX ADMIN — POTASSIUM CHLORIDE SCH MLS/HR: 7.46 INJECTION, SOLUTION INTRAVENOUS at 00:30

## 2022-05-10 RX ADMIN — DOXAZOSIN SCH MG: 4 TABLET ORAL at 09:04

## 2022-05-10 RX ADMIN — METOPROLOL SUCCINATE SCH MG: 25 TABLET, EXTENDED RELEASE ORAL at 09:08

## 2022-05-10 RX ADMIN — LEVALBUTEROL SCH MG: 1.25 SOLUTION RESPIRATORY (INHALATION) at 07:15

## 2022-05-10 RX ADMIN — ALBUTEROL SULFATE SCH PUFFS: 90 AEROSOL, METERED RESPIRATORY (INHALATION) at 20:27

## 2022-05-10 RX ADMIN — Medication SCH MG: at 17:18

## 2022-05-10 RX ADMIN — SODIUM CHLORIDE, PRESERVATIVE FREE PRN ML: 5 INJECTION INTRAVENOUS at 10:02

## 2022-05-10 RX ADMIN — Medication SCH TAB: at 12:25

## 2022-05-10 RX ADMIN — Medication SCH MCG: at 12:24

## 2022-05-10 RX ADMIN — DEXAMETHASONE SODIUM PHOSPHATE SCH MG: 4 INJECTION, SOLUTION INTRA-ARTICULAR; INTRALESIONAL; INTRAMUSCULAR; INTRAVENOUS; SOFT TISSUE at 09:16

## 2022-05-10 RX ADMIN — CALCIUM CARBONATE (ANTACID) CHEW TAB 500 MG SCH MG: 500 CHEW TAB at 11:56

## 2022-05-10 RX ADMIN — INSULIN ASPART SCH UNIT: 100 INJECTION, SOLUTION INTRAVENOUS; SUBCUTANEOUS at 09:01

## 2022-05-10 RX ADMIN — Medication SCH MG: at 09:05

## 2022-05-10 RX ADMIN — INSULIN ASPART SCH UNIT: 100 INJECTION, SOLUTION INTRAVENOUS; SUBCUTANEOUS at 12:26

## 2022-05-10 RX ADMIN — INSULIN ASPART SCH UNIT: 100 INJECTION, SOLUTION INTRAVENOUS; SUBCUTANEOUS at 17:27

## 2022-05-10 RX ADMIN — FAMOTIDINE SCH MG: 20 TABLET, FILM COATED ORAL at 09:04

## 2022-05-10 RX ADMIN — POTASSIUM CHLORIDE SCH MLS/HR: 7.46 INJECTION, SOLUTION INTRAVENOUS at 01:36

## 2022-05-10 RX ADMIN — ALBUTEROL SULFATE SCH PUFFS: 90 AEROSOL, METERED RESPIRATORY (INHALATION) at 15:10

## 2022-05-10 RX ADMIN — SODIUM CHLORIDE SCH MLS/HR: 900 INJECTION INTRAVENOUS at 09:20

## 2022-05-10 RX ADMIN — NICOTINE SCH PATCH: 21 PATCH TRANSDERMAL at 06:07

## 2022-05-10 RX ADMIN — METOPROLOL SUCCINATE SCH MG: 25 TABLET, EXTENDED RELEASE ORAL at 21:42

## 2022-05-10 RX ADMIN — Medication SCH MG: at 12:28

## 2022-05-10 RX ADMIN — SODIUM CHLORIDE, PRESERVATIVE FREE SCH ML: 5 INJECTION INTRAVENOUS at 17:20

## 2022-05-10 RX ADMIN — AZITHROMYCIN MONOHYDRATE SCH MG: 250 TABLET ORAL at 09:04

## 2022-05-10 RX ADMIN — SODIUM CHLORIDE, PRESERVATIVE FREE SCH: 5 INJECTION INTRAVENOUS at 00:30

## 2022-05-10 RX ADMIN — INSULIN ASPART SCH UNIT: 100 INJECTION, SOLUTION INTRAVENOUS; SUBCUTANEOUS at 21:42

## 2022-05-10 RX ADMIN — SODIUM CHLORIDE, PRESERVATIVE FREE PRN ML: 5 INJECTION INTRAVENOUS at 02:46

## 2022-05-10 RX ADMIN — SODIUM CHLORIDE, PRESERVATIVE FREE SCH ML: 5 INJECTION INTRAVENOUS at 09:22

## 2022-05-10 RX ADMIN — Medication SCH MG: at 06:07

## 2022-05-10 RX ADMIN — APIXABAN SCH MG: 2.5 TABLET, FILM COATED ORAL at 21:42

## 2022-05-10 NOTE — PROVIDER PROGRESS NOTE
Subjective





- Prog Note Date


Prog Note Date: 05/10/22





- Subjective


Subjective: 


He feels slightly improved but overall not much better compared to yesterday.  

Still feels short of breath.  No cough.  Denies any chest pain.





Current Medications





- Current Medications


Current Medications: 








Active Medications





Acetaminophen (Acetaminophen 325 Mg Tablet)  650 mg PO Q4HR PRN


   PRN Reason: Pain 1 to 4, or Fever


Albuterol (Albuterol 1 Puff)  2 puffs INH RTQID [facility]


Allopurinol (Allopurinol 100 Mg Tablet)  300 mg PO DAILY [facility]


   Last Admin: 05/10/22 09:04 Dose:  300 mg


   


Apixaban (Apixaban 2.5 Mg Tablet)  2.5 mg PO BID [facility]


Azithromycin (Azithromycin 250 Mg Tablet)  500 mg PO DAILY [facility]


   Stop: 05/12/22 00:01


   Last Admin: 05/10/22 09:04 Dose:  500 mg


   


Cholecalciferol (Cholecalciferol 25 Mcg Tablet)  50 mcg PO DAILY [facility]


   Last Admin: 05/10/22 12:24 Dose:  50 mcg


   


Dexamethasone (Dexamethasone 4 Mg/Ml Vial)  6 mg IVP DAILY [facility]


   Last Admin: 05/10/22 09:16 Dose:  6 mg


   


Digoxin (Digoxin 125 Mcg Tablet)  125 mcg PO DAILY [facility]


   Last Admin: 05/10/22 09:04 Dose:  125 mcg


   


Doxazosin Mesylate (Doxazosin 4 Mg Tablet)  4 mg PO DAILY [facility]


   Last Admin: 05/10/22 09:04 Dose:  4 mg


   


Furosemide (Furosemide 40 Mg Tablet)  40 mg PO DAILY [facility]


Guaifenesin (Guaifenesin 600 Mg Tablet)  600 mg PO BID [facility]


   Last Admin: 05/10/22 09:09 Dose:  600 mg


   


Ceftriaxone Sodium 2 gm/ (Sodium Chloride)  100 mls @ 200 mls/hr IV DAILY [facility]


   Last Infusion: 05/10/22 09:50 Dose:  Infused


   


Insulin Aspart (Insulin Aspart 300 Unit/3 Ml Pen)  1 - 5 unit SUBQ 

0800,1200,1700,2100 [facility]; Protocol


   Last Admin: 05/10/22 12:26 Dose:  2 unit


   


Levalbuterol HCl (Levalbuterol 1.25 Mg/3 Ml Neb)  1.25 mg INH Q4H PRN


   PRN Reason: Shortness of Air/Wheezing


Magnesium Oxide (Magnesium Oxide 400 Mg Tablet)  400 mg PO ONCE [facility]


   Stop: 05/10/22 22:59


   Last Admin: 05/09/22 23:24 Dose:  400 mg


   


Metoprolol Succinate (Metoprolol Succinate 25 Mg Tablet)  25 mg PO BID [facility]


   Last Admin: 05/10/22 09:08 Dose:  25 mg


   


Multivitamins/Minerals (Multivitamin W/Minerals Tablet)  1 tab PO DAILYWM [facility]


   Last Admin: 05/10/22 12:25 Dose:  1 tab


   


Nicotine (Nicotine 21 Mg Patch)  1 patch TOP 0600 [facility]


   Last Admin: 05/10/22 06:07 Dose:  1 patch


   


Ondansetron HCl (Ondansetron 4 Mg/2 Ml Vial)  4 mg IVP Q6HR PRN


   PRN Reason: Nausea / Vomiting


Saccharomyces Boulardii (Saccharomyces Boulardii 250 Mg Capsule)  250 mg PO 

BIDWM [facility]


   Last Admin: 05/10/22 09:05 Dose:  250 mg


   


Sodium Chloride (Sodium Chloride Flush 0.9% 10 Ml Syringe)  10 ml IVP 

0100,0900,1700 [facility]


   Last Admin: 05/10/22 09:22 Dose:  10 ml


   


Sodium Chloride (Sodium Chloride Flush 0.9% 10 Ml Syringe)  10 ml IVP PRN PRN


   PRN Reason: AS NEEDED PER PROVIDER ORDERS


   Last Admin: 05/10/22 10:02 Dose:  10 ml


   





                                        





Doxazosin [Cardura] 4 mg PO DAILY 01/25/17 


Furosemide 40 mg PO DAILY 01/25/17 


Metoprolol Succinate [Toprol Xl] 25 mg PO DAILY 01/25/17 


allopurinoL [Allopurinol] 300 mg PO DAILY 01/25/17 


Apixaban [Eliquis] 2.5 mg PO BID 05/10/22 











Objective





- Vital Signs/Intake & Output


Reviewed Vital Signs: Yes


Vital Signs: 





                                   Vital Signs











  Temp Pulse Resp BP Pulse Ox


 


 05/10/22 08:00  36.5 C  80  24  142/98 H  93


 


 05/10/22 07:00   83  28 H  152/74 H  96


 


 05/10/22 06:00   78  27 H  136/74 H  95


 


 05/10/22 05:00   71  21  137/67 H  96











Intake & Output: 





                                 Intake & Output











 05/07/22 05/08/22 05/09/22 05/10/22





 23:59 23:59 23:59 23:59


 


Intake Total   350 475.25


 


Output Total   168 915


 


Balance   182 -439.75














- Objective


General Appearance: positive: No acute distress, Alert


Eyes Bilateral: positive: Conjunctivae nml


ENT: positive: ENT inspection nml, Other (Nasal cannula in place.)


Neck: positive: Nml inspection


Respiratory: positive: No respiratory distress, Other (Diminished in bases.)


Cardiovascular: positive: Irregularly irregular.  negative: Tachycardia, 

Systolic murmur


Abdomen: positive: Non-tender, No distention.  negative: Tenderness


Skin: positive: Warm, Dry


Extremities: positive: Pedal edema (Trace nonpitting edema in right lowere 

extremity.)


Neurologic/Psychiatric: negative: Disoriented to person, Disoriented to place





- Lab Results


Fish Bones: 


                                 05/11/22 04:42





                                 05/11/22 04:42


Other Labs: 





                               Lab Results x24hrs











  05/10/22 05/10/22 05/10/22 Range/Units





  04:09 04:09 04:09 


 


WBC     (4.8-10.8)  x10^3/uL


 


RBC     (4.70-6.10)  10^6/uL


 


Hgb     (14.0-18.0)  g/dL


 


Hct     (42.0-52.0)  %


 


MCV     (80.0-94.0)  fL


 


MCH     (27.0-31.0)  pg


 


MCHC     (32.0-36.0)  g/dL


 


RDW     (12.0-15.0)  %


 


Plt Count     (130-450)  10^3/uL


 


MPV     (7.4-11.4)  fL


 


Neut # (Auto)     (1.5-6.6)  10^3/uL


 


Lymph # (Auto)     (1.5-3.5)  10^3/uL


 


Mono # (Auto)     (0.0-1.0)  10^3/uL


 


Eos # (Auto)     (0.0-0.7)  10^3/uL


 


Baso # (Auto)     (0.0-0.1)  10^3/uL


 


Absolute Nucleated RBC     x10^3/uL


 


Nucleated RBC %     /100WBC


 


PT     (9.9-12.6)  secs


 


INR     (0.8-1.2)  


 


VBG pH    7.345  (7.31-7.41)  


 


VBG pCO2     (41-51)  mmHg


 


VBG pO2     (25-47)  mmHg


 


VBG HCO3     (23-28)  mmol/L


 


VBG Total CO2     (24-29)  mmol/L


 


VBG O2 Saturation     (60-80)  %


 


VBG Base Excess     (-2 - +2)  mmol/L


 


Ionized Calcium    1.09 L  (1.15-1.33)  mmol/L


 


Sodium     (135-145)  mmol/L


 


Potassium     (3.5-5.0)  mmol/L


 


Chloride     (101-111)  mmol/L


 


Carbon Dioxide     (21-32)  mmol/L


 


Anion Gap     (6-13)  


 


BUN     (6-20)  mg/dL


 


Creatinine     (0.6-1.2)  mg/dL


 


Estimated GFR (MDRD)     (>89)  


 


Glucose     ()  mg/dL


 


Lactic Acid     (0.5-2.2)  mmol/L


 


Calcium     (8.5-10.3)  mg/dL


 


Phosphorus     (2.5-4.6)  mg/dL


 


Magnesium     (1.7-2.8)  mg/dL


 


Total Bilirubin     (0.2-1.0)  mg/dL


 


AST     (10-42)  IU/L


 


ALT     (10-60)  IU/L


 


Alkaline Phosphatase     ()  IU/L


 


Troponin I High Sens     (2.3-19.7)  ng/L


 


B-Natriuretic Peptide     (5-100)  pg/mL


 


Total Protein     (6.7-8.2)  g/dL


 


Albumin     (3.2-5.5)  g/dL


 


Globulin     (2.1-4.2)  g/dL


 


Albumin/Globulin Ratio     (1.0-2.2)  


 


TSH   0.13 L   (0.34-5.60)  uIU/mL


 


Free T4  1.76 H    (0.58-1.64)  ng/dL


 


Nasal Adenovirus (PCR)     


 


Nasal B. parapertussis DNA (PCR)     


 


Nasal Coronavir 229E PCR     


 


Nasal Coronavir HKU1 PCR     


 


Nasal Coronavir NL63 PCR     


 


Nasal Coronavir OC43 PCR     


 


Nasal Enterovir/Rhinovir PCR     


 


Nasal Influenza B PCR     


 


Nasal Influenza A PCR     


 


Nasal Parainfluen 1 PCR     


 


Nasal Parainfluen 2 PCR     


 


Nasal Parainfluen 3 PCR     


 


Nasal Parainfluen 4 PCR     


 


Nasal RSV (PCR)     


 


Nasal Screen MRSA (PCR)     (NEGATIVE)  


 


Nasal B.pertussis DNA PCR     


 


Nasal C.pneumoniae (PCR)     


 


Ger Human Metapneumo PCR     


 


Nasal M.pneumoniae (PCR)     


 


Nasal SARS-CoV-2 (PCR)     


 


Last Dose Date     


 


Last Dose Time     


 


Digoxin     ng/mL














  05/10/22 05/10/22 05/10/22 Range/Units





  04:09 04:09 04:09 


 


WBC     (4.8-10.8)  x10^3/uL


 


RBC     (4.70-6.10)  10^6/uL


 


Hgb     (14.0-18.0)  g/dL


 


Hct     (42.0-52.0)  %


 


MCV     (80.0-94.0)  fL


 


MCH     (27.0-31.0)  pg


 


MCHC     (32.0-36.0)  g/dL


 


RDW     (12.0-15.0)  %


 


Plt Count     (130-450)  10^3/uL


 


MPV     (7.4-11.4)  fL


 


Neut # (Auto)     (1.5-6.6)  10^3/uL


 


Lymph # (Auto)     (1.5-3.5)  10^3/uL


 


Mono # (Auto)     (0.0-1.0)  10^3/uL


 


Eos # (Auto)     (0.0-0.7)  10^3/uL


 


Baso # (Auto)     (0.0-0.1)  10^3/uL


 


Absolute Nucleated RBC     x10^3/uL


 


Nucleated RBC %     /100WBC


 


PT   13.6 H   (9.9-12.6)  secs


 


INR   1.2   (0.8-1.2)  


 


VBG pH     (7.31-7.41)  


 


VBG pCO2     (41-51)  mmHg


 


VBG pO2     (25-47)  mmHg


 


VBG HCO3     (23-28)  mmol/L


 


VBG Total CO2     (24-29)  mmol/L


 


VBG O2 Saturation     (60-80)  %


 


VBG Base Excess     (-2 - +2)  mmol/L


 


Ionized Calcium     (1.15-1.33)  mmol/L


 


Sodium    144  (135-145)  mmol/L


 


Potassium    3.7  (3.5-5.0)  mmol/L


 


Chloride    102  (101-111)  mmol/L


 


Carbon Dioxide    30  (21-32)  mmol/L


 


Anion Gap    12.0  (6-13)  


 


BUN    17  (6-20)  mg/dL


 


Creatinine    1.4 H  (0.6-1.2)  mg/dL


 


Estimated GFR (MDRD)    48 L  (>89)  


 


Glucose    170 H  ()  mg/dL


 


Lactic Acid     (0.5-2.2)  mmol/L


 


Calcium    8.6  (8.5-10.3)  mg/dL


 


Phosphorus    3.8  (2.5-4.6)  mg/dL


 


Magnesium    1.6 L  (1.7-2.8)  mg/dL


 


Total Bilirubin     (0.2-1.0)  mg/dL


 


AST     (10-42)  IU/L


 


ALT     (10-60)  IU/L


 


Alkaline Phosphatase     ()  IU/L


 


Troponin I High Sens     (2.3-19.7)  ng/L


 


B-Natriuretic Peptide  2503 H    (5-100)  pg/mL


 


Total Protein     (6.7-8.2)  g/dL


 


Albumin     (3.2-5.5)  g/dL


 


Globulin     (2.1-4.2)  g/dL


 


Albumin/Globulin Ratio     (1.0-2.2)  


 


TSH     (0.34-5.60)  uIU/mL


 


Free T4     (0.58-1.64)  ng/dL


 


Nasal Adenovirus (PCR)     


 


Nasal B. parapertussis DNA (PCR)     


 


Nasal Coronavir 229E PCR     


 


Nasal Coronavir HKU1 PCR     


 


Nasal Coronavir NL63 PCR     


 


Nasal Coronavir OC43 PCR     


 


Nasal Enterovir/Rhinovir PCR     


 


Nasal Influenza B PCR     


 


Nasal Influenza A PCR     


 


Nasal Parainfluen 1 PCR     


 


Nasal Parainfluen 2 PCR     


 


Nasal Parainfluen 3 PCR     


 


Nasal Parainfluen 4 PCR     


 


Nasal RSV (PCR)     


 


Nasal Screen MRSA (PCR)     (NEGATIVE)  


 


Nasal B.pertussis DNA PCR     


 


Nasal C.pneumoniae (PCR)     


 


Egr Human Metapneumo PCR     


 


Nasal M.pneumoniae (PCR)     


 


Nasal SARS-CoV-2 (PCR)     


 


Last Dose Date     


 


Last Dose Time     


 


Digoxin     ng/mL














  05/10/22 05/09/22 05/09/22 Range/Units





  04:09 23:30 21:50 


 


WBC  7.3    (4.8-10.8)  x10^3/uL


 


RBC  4.35 L    (4.70-6.10)  10^6/uL


 


Hgb  13.5 L    (14.0-18.0)  g/dL


 


Hct  40.5 L    (42.0-52.0)  %


 


MCV  93.1    (80.0-94.0)  fL


 


MCH  31.0    (27.0-31.0)  pg


 


MCHC  33.3    (32.0-36.0)  g/dL


 


RDW  13.6    (12.0-15.0)  %


 


Plt Count  240    (130-450)  10^3/uL


 


MPV  11.5 H    (7.4-11.4)  fL


 


Neut # (Auto)  6.7 H    (1.5-6.6)  10^3/uL


 


Lymph # (Auto)  0.3 L    (1.5-3.5)  10^3/uL


 


Mono # (Auto)  0.2    (0.0-1.0)  10^3/uL


 


Eos # (Auto)  0.0    (0.0-0.7)  10^3/uL


 


Baso # (Auto)  0.0    (0.0-0.1)  10^3/uL


 


Absolute Nucleated RBC  0.00    x10^3/uL


 


Nucleated RBC %  0.0    /100WBC


 


PT     (9.9-12.6)  secs


 


INR     (0.8-1.2)  


 


VBG pH     (7.31-7.41)  


 


VBG pCO2     (41-51)  mmHg


 


VBG pO2     (25-47)  mmHg


 


VBG HCO3     (23-28)  mmol/L


 


VBG Total CO2     (24-29)  mmol/L


 


VBG O2 Saturation     (60-80)  %


 


VBG Base Excess     (-2 - +2)  mmol/L


 


Ionized Calcium     (1.15-1.33)  mmol/L


 


Sodium     (135-145)  mmol/L


 


Potassium     (3.5-5.0)  mmol/L


 


Chloride     (101-111)  mmol/L


 


Carbon Dioxide     (21-32)  mmol/L


 


Anion Gap     (6-13)  


 


BUN     (6-20)  mg/dL


 


Creatinine     (0.6-1.2)  mg/dL


 


Estimated GFR (MDRD)     (>89)  


 


Glucose     ()  mg/dL


 


Lactic Acid     (0.5-2.2)  mmol/L


 


Calcium     (8.5-10.3)  mg/dL


 


Phosphorus     (2.5-4.6)  mg/dL


 


Magnesium     (1.7-2.8)  mg/dL


 


Total Bilirubin     (0.2-1.0)  mg/dL


 


AST     (10-42)  IU/L


 


ALT     (10-60)  IU/L


 


Alkaline Phosphatase     ()  IU/L


 


Troponin I High Sens   50.2 H*   (2.3-19.7)  ng/L


 


B-Natriuretic Peptide     (5-100)  pg/mL


 


Total Protein     (6.7-8.2)  g/dL


 


Albumin     (3.2-5.5)  g/dL


 


Globulin     (2.1-4.2)  g/dL


 


Albumin/Globulin Ratio     (1.0-2.2)  


 


TSH     (0.34-5.60)  uIU/mL


 


Free T4     (0.58-1.64)  ng/dL


 


Nasal Adenovirus (PCR)     


 


Nasal B. parapertussis DNA (PCR)     


 


Nasal Coronavir 229E PCR     


 


Nasal Coronavir HKU1 PCR     


 


Nasal Coronavir NL63 PCR     


 


Nasal Coronavir OC43 PCR     


 


Nasal Enterovir/Rhinovir PCR     


 


Nasal Influenza B PCR     


 


Nasal Influenza A PCR     


 


Nasal Parainfluen 1 PCR     


 


Nasal Parainfluen 2 PCR     


 


Nasal Parainfluen 3 PCR     


 


Nasal Parainfluen 4 PCR     


 


Nasal RSV (PCR)     


 


Nasal Screen MRSA (PCR)    NEGATIVE  (NEGATIVE)  


 


Nasal B.pertussis DNA PCR     


 


Nasal C.pneumoniae (PCR)     


 


Ger Human Metapneumo PCR     


 


Nasal M.pneumoniae (PCR)     


 


Nasal SARS-CoV-2 (PCR)     


 


Last Dose Date     


 


Last Dose Time     


 


Digoxin     ng/mL














  05/09/22 05/09/22 05/09/22 Range/Units





  20:56 20:56 18:30 


 


WBC     (4.8-10.8)  x10^3/uL


 


RBC     (4.70-6.10)  10^6/uL


 


Hgb     (14.0-18.0)  g/dL


 


Hct     (42.0-52.0)  %


 


MCV     (80.0-94.0)  fL


 


MCH     (27.0-31.0)  pg


 


MCHC     (32.0-36.0)  g/dL


 


RDW     (12.0-15.0)  %


 


Plt Count     (130-450)  10^3/uL


 


MPV     (7.4-11.4)  fL


 


Neut # (Auto)     (1.5-6.6)  10^3/uL


 


Lymph # (Auto)     (1.5-3.5)  10^3/uL


 


Mono # (Auto)     (0.0-1.0)  10^3/uL


 


Eos # (Auto)     (0.0-0.7)  10^3/uL


 


Baso # (Auto)     (0.0-0.1)  10^3/uL


 


Absolute Nucleated RBC     x10^3/uL


 


Nucleated RBC %     /100WBC


 


PT  13.8 H    (9.9-12.6)  secs


 


INR  1.2    (0.8-1.2)  


 


VBG pH     (7.31-7.41)  


 


VBG pCO2     (41-51)  mmHg


 


VBG pO2     (25-47)  mmHg


 


VBG HCO3     (23-28)  mmol/L


 


VBG Total CO2     (24-29)  mmol/L


 


VBG O2 Saturation     (60-80)  %


 


VBG Base Excess     (-2 - +2)  mmol/L


 


Ionized Calcium     (1.15-1.33)  mmol/L


 


Sodium     (135-145)  mmol/L


 


Potassium     (3.5-5.0)  mmol/L


 


Chloride     (101-111)  mmol/L


 


Carbon Dioxide     (21-32)  mmol/L


 


Anion Gap     (6-13)  


 


BUN     (6-20)  mg/dL


 


Creatinine     (0.6-1.2)  mg/dL


 


Estimated GFR (MDRD)     (>89)  


 


Glucose     ()  mg/dL


 


Lactic Acid     (0.5-2.2)  mmol/L


 


Calcium     (8.5-10.3)  mg/dL


 


Phosphorus     (2.5-4.6)  mg/dL


 


Magnesium     (1.7-2.8)  mg/dL


 


Total Bilirubin     (0.2-1.0)  mg/dL


 


AST     (10-42)  IU/L


 


ALT     (10-60)  IU/L


 


Alkaline Phosphatase     ()  IU/L


 


Troponin I High Sens   55.5 H*   (2.3-19.7)  ng/L


 


B-Natriuretic Peptide     (5-100)  pg/mL


 


Total Protein     (6.7-8.2)  g/dL


 


Albumin     (3.2-5.5)  g/dL


 


Globulin     (2.1-4.2)  g/dL


 


Albumin/Globulin Ratio     (1.0-2.2)  


 


TSH     (0.34-5.60)  uIU/mL


 


Free T4     (0.58-1.64)  ng/dL


 


Nasal Adenovirus (PCR)    NOT DETECTED  


 


Nasal B. parapertussis DNA (PCR)    NOT DETECTED  


 


Nasal Coronavir 229E PCR    NOT DETECTED  


 


Nasal Coronavir HKU1 PCR    NOT DETECTED  


 


Nasal Coronavir NL63 PCR    NOT DETECTED  


 


Nasal Coronavir OC43 PCR    NOT DETECTED  


 


Nasal Enterovir/Rhinovir PCR    NOT DETECTED  


 


Nasal Influenza B PCR    NOT DETECTED  


 


Nasal Influenza A PCR    NOT DETECTED  


 


Nasal Parainfluen 1 PCR    NOT DETECTED  


 


Nasal Parainfluen 2 PCR    NOT DETECTED  


 


Nasal Parainfluen 3 PCR    NOT DETECTED  


 


Nasal Parainfluen 4 PCR    NOT DETECTED  


 


Nasal RSV (PCR)    NOT DETECTED  


 


Nasal Screen MRSA (PCR)     (NEGATIVE)  


 


Nasal B.pertussis DNA PCR    NOT DETECTED  


 


Nasal C.pneumoniae (PCR)    NOT DETECTED  


 


Ger Human Metapneumo PCR    NOT DETECTED  


 


Nasal M.pneumoniae (PCR)    NOT DETECTED  


 


Nasal SARS-CoV-2 (PCR)    DETECTED A  


 


Last Dose Date     


 


Last Dose Time     


 


Digoxin     ng/mL














  05/09/22 05/09/22 05/09/22 Range/Units





  18:26 18:26 18:26 


 


WBC     (4.8-10.8)  x10^3/uL


 


RBC     (4.70-6.10)  10^6/uL


 


Hgb     (14.0-18.0)  g/dL


 


Hct     (42.0-52.0)  %


 


MCV     (80.0-94.0)  fL


 


MCH     (27.0-31.0)  pg


 


MCHC     (32.0-36.0)  g/dL


 


RDW     (12.0-15.0)  %


 


Plt Count     (130-450)  10^3/uL


 


MPV     (7.4-11.4)  fL


 


Neut # (Auto)     (1.5-6.6)  10^3/uL


 


Lymph # (Auto)     (1.5-3.5)  10^3/uL


 


Mono # (Auto)     (0.0-1.0)  10^3/uL


 


Eos # (Auto)     (0.0-0.7)  10^3/uL


 


Baso # (Auto)     (0.0-0.1)  10^3/uL


 


Absolute Nucleated RBC     x10^3/uL


 


Nucleated RBC %     /100WBC


 


PT     (9.9-12.6)  secs


 


INR     (0.8-1.2)  


 


VBG pH   7.399   (7.31-7.41)  


 


VBG pCO2   55.6 H   (41-51)  mmHg


 


VBG pO2   19.9 L   (25-47)  mmHg


 


VBG HCO3   33.6 H   (23-28)  mmol/L


 


VBG Total CO2   35.3 H   (24-29)  mmol/L


 


VBG O2 Saturation   34.0 L   (60-80)  %


 


VBG Base Excess   7.0 H   (-2 - +2)  mmol/L


 


Ionized Calcium     (1.15-1.33)  mmol/L


 


Sodium     (135-145)  mmol/L


 


Potassium     (3.5-5.0)  mmol/L


 


Chloride     (101-111)  mmol/L


 


Carbon Dioxide     (21-32)  mmol/L


 


Anion Gap     (6-13)  


 


BUN     (6-20)  mg/dL


 


Creatinine     (0.6-1.2)  mg/dL


 


Estimated GFR (MDRD)     (>89)  


 


Glucose     ()  mg/dL


 


Lactic Acid    1.7  (0.5-2.2)  mmol/L


 


Calcium     (8.5-10.3)  mg/dL


 


Phosphorus     (2.5-4.6)  mg/dL


 


Magnesium     (1.7-2.8)  mg/dL


 


Total Bilirubin     (0.2-1.0)  mg/dL


 


AST     (10-42)  IU/L


 


ALT     (10-60)  IU/L


 


Alkaline Phosphatase     ()  IU/L


 


Troponin I High Sens     (2.3-19.7)  ng/L


 


B-Natriuretic Peptide     (5-100)  pg/mL


 


Total Protein     (6.7-8.2)  g/dL


 


Albumin     (3.2-5.5)  g/dL


 


Globulin     (2.1-4.2)  g/dL


 


Albumin/Globulin Ratio     (1.0-2.2)  


 


TSH     (0.34-5.60)  uIU/mL


 


Free T4     (0.58-1.64)  ng/dL


 


Nasal Adenovirus (PCR)     


 


Nasal B. parapertussis DNA (PCR)     


 


Nasal Coronavir 229E PCR     


 


Nasal Coronavir HKU1 PCR     


 


Nasal Coronavir NL63 PCR     


 


Nasal Coronavir OC43 PCR     


 


Nasal Enterovir/Rhinovir PCR     


 


Nasal Influenza B PCR     


 


Nasal Influenza A PCR     


 


Nasal Parainfluen 1 PCR     


 


Nasal Parainfluen 2 PCR     


 


Nasal Parainfluen 3 PCR     


 


Nasal Parainfluen 4 PCR     


 


Nasal RSV (PCR)     


 


Nasal Screen MRSA (PCR)     (NEGATIVE)  


 


Nasal B.pertussis DNA PCR     


 


Nasal C.pneumoniae (PCR)     


 


Ger Human Metapneumo PCR     


 


Nasal M.pneumoniae (PCR)     


 


Nasal SARS-CoV-2 (PCR)     


 


Last Dose Date  UNKNOWN    


 


Last Dose Time  UNKNOWN    


 


Digoxin  < 0.2    ng/mL














  05/09/22 05/09/22 05/09/22 Range/Units





  18:26 18:26 18:26 


 


WBC     (4.8-10.8)  x10^3/uL


 


RBC     (4.70-6.10)  10^6/uL


 


Hgb     (14.0-18.0)  g/dL


 


Hct     (42.0-52.0)  %


 


MCV     (80.0-94.0)  fL


 


MCH     (27.0-31.0)  pg


 


MCHC     (32.0-36.0)  g/dL


 


RDW     (12.0-15.0)  %


 


Plt Count     (130-450)  10^3/uL


 


MPV     (7.4-11.4)  fL


 


Neut # (Auto)     (1.5-6.6)  10^3/uL


 


Lymph # (Auto)     (1.5-3.5)  10^3/uL


 


Mono # (Auto)     (0.0-1.0)  10^3/uL


 


Eos # (Auto)     (0.0-0.7)  10^3/uL


 


Baso # (Auto)     (0.0-0.1)  10^3/uL


 


Absolute Nucleated RBC     x10^3/uL


 


Nucleated RBC %     /100WBC


 


PT     (9.9-12.6)  secs


 


INR     (0.8-1.2)  


 


VBG pH     (7.31-7.41)  


 


VBG pCO2     (41-51)  mmHg


 


VBG pO2     (25-47)  mmHg


 


VBG HCO3     (23-28)  mmol/L


 


VBG Total CO2     (24-29)  mmol/L


 


VBG O2 Saturation     (60-80)  %


 


VBG Base Excess     (-2 - +2)  mmol/L


 


Ionized Calcium     (1.15-1.33)  mmol/L


 


Sodium    140  (135-145)  mmol/L


 


Potassium    3.2 L  (3.5-5.0)  mmol/L


 


Chloride    97 L  (101-111)  mmol/L


 


Carbon Dioxide    31  (21-32)  mmol/L


 


Anion Gap    12.0  (6-13)  


 


BUN    17  (6-20)  mg/dL


 


Creatinine    1.6 H  (0.6-1.2)  mg/dL


 


Estimated GFR (MDRD)    41 L  (>89)  


 


Glucose    117 H  ()  mg/dL


 


Lactic Acid     (0.5-2.2)  mmol/L


 


Calcium    8.6  (8.5-10.3)  mg/dL


 


Phosphorus    2.9  (2.5-4.6)  mg/dL


 


Magnesium    1.4 L  (1.7-2.8)  mg/dL


 


Total Bilirubin    1.4 H  (0.2-1.0)  mg/dL


 


AST    22  (10-42)  IU/L


 


ALT    20  (10-60)  IU/L


 


Alkaline Phosphatase    178 H  ()  IU/L


 


Troponin I High Sens   47.7 H*   (2.3-19.7)  ng/L


 


B-Natriuretic Peptide  1928 H    (5-100)  pg/mL


 


Total Protein    6.9  (6.7-8.2)  g/dL


 


Albumin    2.7 L  (3.2-5.5)  g/dL


 


Globulin    4.2  (2.1-4.2)  g/dL


 


Albumin/Globulin Ratio    0.6 L  (1.0-2.2)  


 


TSH     (0.34-5.60)  uIU/mL


 


Free T4     (0.58-1.64)  ng/dL


 


Nasal Adenovirus (PCR)     


 


Nasal B. parapertussis DNA (PCR)     


 


Nasal Coronavir 229E PCR     


 


Nasal Coronavir HKU1 PCR     


 


Nasal Coronavir NL63 PCR     


 


Nasal Coronavir OC43 PCR     


 


Nasal Enterovir/Rhinovir PCR     


 


Nasal Influenza B PCR     


 


Nasal Influenza A PCR     


 


Nasal Parainfluen 1 PCR     


 


Nasal Parainfluen 2 PCR     


 


Nasal Parainfluen 3 PCR     


 


Nasal Parainfluen 4 PCR     


 


Nasal RSV (PCR)     


 


Nasal Screen MRSA (PCR)     (NEGATIVE)  


 


Nasal B.pertussis DNA PCR     


 


Nasal C.pneumoniae (PCR)     


 


Ger Human Metapneumo PCR     


 


Nasal M.pneumoniae (PCR)     


 


Nasal SARS-CoV-2 (PCR)     


 


Last Dose Date     


 


Last Dose Time     


 


Digoxin     ng/mL














  05/09/22 Range/Units





  18:26 


 


WBC  7.4  (4.8-10.8)  x10^3/uL


 


RBC  4.56 L  (4.70-6.10)  10^6/uL


 


Hgb  13.9 L  (14.0-18.0)  g/dL


 


Hct  42.6  (42.0-52.0)  %


 


MCV  93.4  (80.0-94.0)  fL


 


MCH  30.5  (27.0-31.0)  pg


 


MCHC  32.6  (32.0-36.0)  g/dL


 


RDW  13.8  (12.0-15.0)  %


 


Plt Count  238  (130-450)  10^3/uL


 


MPV  10.7  (7.4-11.4)  fL


 


Neut # (Auto)  6.2  (1.5-6.6)  10^3/uL


 


Lymph # (Auto)  0.5 L  (1.5-3.5)  10^3/uL


 


Mono # (Auto)  0.6  (0.0-1.0)  10^3/uL


 


Eos # (Auto)  0.0  (0.0-0.7)  10^3/uL


 


Baso # (Auto)  0.0  (0.0-0.1)  10^3/uL


 


Absolute Nucleated RBC  0.00  x10^3/uL


 


Nucleated RBC %  0.0  /100WBC


 


PT   (9.9-12.6)  secs


 


INR   (0.8-1.2)  


 


VBG pH   (7.31-7.41)  


 


VBG pCO2   (41-51)  mmHg


 


VBG pO2   (25-47)  mmHg


 


VBG HCO3   (23-28)  mmol/L


 


VBG Total CO2   (24-29)  mmol/L


 


VBG O2 Saturation   (60-80)  %


 


VBG Base Excess   (-2 - +2)  mmol/L


 


Ionized Calcium   (1.15-1.33)  mmol/L


 


Sodium   (135-145)  mmol/L


 


Potassium   (3.5-5.0)  mmol/L


 


Chloride   (101-111)  mmol/L


 


Carbon Dioxide   (21-32)  mmol/L


 


Anion Gap   (6-13)  


 


BUN   (6-20)  mg/dL


 


Creatinine   (0.6-1.2)  mg/dL


 


Estimated GFR (MDRD)   (>89)  


 


Glucose   ()  mg/dL


 


Lactic Acid   (0.5-2.2)  mmol/L


 


Calcium   (8.5-10.3)  mg/dL


 


Phosphorus   (2.5-4.6)  mg/dL


 


Magnesium   (1.7-2.8)  mg/dL


 


Total Bilirubin   (0.2-1.0)  mg/dL


 


AST   (10-42)  IU/L


 


ALT   (10-60)  IU/L


 


Alkaline Phosphatase   ()  IU/L


 


Troponin I High Sens   (2.3-19.7)  ng/L


 


B-Natriuretic Peptide   (5-100)  pg/mL


 


Total Protein   (6.7-8.2)  g/dL


 


Albumin   (3.2-5.5)  g/dL


 


Globulin   (2.1-4.2)  g/dL


 


Albumin/Globulin Ratio   (1.0-2.2)  


 


TSH   (0.34-5.60)  uIU/mL


 


Free T4   (0.58-1.64)  ng/dL


 


Nasal Adenovirus (PCR)   


 


Nasal B. parapertussis DNA (PCR)   


 


Nasal Coronavir 229E PCR   


 


Nasal Coronavir HKU1 PCR   


 


Nasal Coronavir NL63 PCR   


 


Nasal Coronavir OC43 PCR   


 


Nasal Enterovir/Rhinovir PCR   


 


Nasal Influenza B PCR   


 


Nasal Influenza A PCR   


 


Nasal Parainfluen 1 PCR   


 


Nasal Parainfluen 2 PCR   


 


Nasal Parainfluen 3 PCR   


 


Nasal Parainfluen 4 PCR   


 


Nasal RSV (PCR)   


 


Nasal Screen MRSA (PCR)   (NEGATIVE)  


 


Nasal B.pertussis DNA PCR   


 


Nasal C.pneumoniae (PCR)   


 


Ger Human Metapneumo PCR   


 


Nasal M.pneumoniae (PCR)   


 


Nasal SARS-CoV-2 (PCR)   


 


Last Dose Date   


 


Last Dose Time   


 


Digoxin   ng/mL














Assessment/Plan





- Problem List


(1) CAP (community acquired pneumonia)


Impression: 


Imaging suggested right lower lobe infiltrate and his presentation is consistent

 with pneumonia.  He is also COVID-positive.  He is on 2 L of oxygen but his 

sats in the high 90s he will look to wean this down today to a goal greater than

 88% given his underlying COPD.  We will keep him on Decadron given the COVID 19

 infection.  We will transfer him out of the ICU today and if he continues to do

 well we can consider discharge in 1 to 2 days.  We will continue ceftriaxone 

and azithromycin.








(2) COVID-19


Impression: 


He is noted to be COVID-positive and is not vaccinated.  His chest x-ray 

suggests right lower lobe infiltrate.  He is on ceftriaxone and azithromycin and

 also Decadron given his hypoxia.  Today is day 2 of both of these treatments.  

Continue contact precautions.








(3) Atrial fibrillation with RVR


Impression: 


This is now resolved.  He is rate controlled on metoprolol and diltiazem drip 

has been discontinued.  We will continue him on his home Eliquis.  We will 

transfer him out of the ICU given he is no longer requiring a diltiazem drip.  

We will discontinue digoxin given he is not actually taking it at home.  If he 

cannot control his heart with metoprolol then we can consider adding digoxin.








(4) TABITHA (acute kidney injury)


Impression: 


We suspect he may have mild acute kidney injury given his creatinine today is 

1.4 and was 1.6 on admission.  Labs from 5 years ago revealed a baseline of 1.1 

but it is unclear if this has changed since then.  We will continue his oral 

Lasix.  We will continue to monitor his renal function and urine output.








(5) COPD (chronic obstructive pulmonary disease)


Impression: 


He does have COPD and reports he is on oxygen at night but not during the day.  

He does not know how much.  It also appears he is not on any inhalers.  Josr,

 does not appear to be in exacerbation at this time.  We will continue with emma

eduled albuterol 4 times daily.  Goal oxygen saturation greater than 88%.  He 

will need an exercise desaturation test prior to discharge.








(6) Suspected CHF (congestive heart failure)


Impression: 


Suspect he has heart failure although I am not sure this is actually 

contributing to his hypoxia.  His BNP is increased today but he does not appear 

to be overtly edematous except in the right lower extremity.  There was not 

really suggestion of pulmonary edema on imaging.  We are continuing his home 

Lasix.  We will look to see if the nocturnist can perform an echocardiogram this

 evening given she is a board-certified cardiologist to assess his LV function. 

 We will continue his oral diuretics but if his dyspnea gets worse then we will 

consider IV diuretics.  Continue with daily weights.  Daily BNP.








(7) Edema of right lower extremity


Impression: 


He is unable to clarify if this is a chronic problem or not.  Duplex negative 

for DVT.  Doubt this is due to heart failure given the asymmetric edema.  We 

will continue to monitor.








(8) Hx of coronary artery disease


Impression: 


Stable.  His troponins were only mildly elevated and this is likely due to 

demand ischemia secondary to the pneumonia.  His EKG did not suggest ischemia.  

We are continuing his home Eliquis and he is now on a beta-blocker.








(9) Type 2 diabetes mellitus


Impression: 


His A1c is 6.5% and his blood glucose has been relatively well controlled 

despite the use of Decadron.  He is no longer on insulin at home she will 

discontinue the Lantus to prevent hypoglycemia.  We will continue with sliding 

scale.  Carb controlled diet.








(10) BPH (benign prostatic hyperplasia)


Impression: 


Continue doxazosin.

## 2022-05-10 NOTE — PHARMACY PROGRESS NOTE
- Best Possible Medication History


Admit Date and Time: 05/09/22 2017


Processed by: Pharmacy


Medication History completed: Yes


Patient Interview: Pt unable to participate


Secondary Source(s): Insurance records


Patient is not familiar with his medications. I was unable to reach patient's 

spouse at phone number provided in chart. Medication list updated using 

insurance fill information.





As the person ultimately responsible for medication therapy, providers are able 

to order a medication from an existing home medication list in [facility] via the 

"Reconcile Routine" prior to Confirmation of that medication by support staff. 

Such practice is discouraged except when the physician, in their clinical 

judgment, deems that a medical need exists for a medication without regard to 

previous use.

## 2022-05-11 LAB
ANION GAP SERPL CALCULATED.4IONS-SCNC: 11 MMOL/L (ref 6–13)
BASOPHILS NFR BLD AUTO: 0 10^3/UL (ref 0–0.1)
BASOPHILS NFR BLD AUTO: 0.1 %
BUN SERPL-MCNC: 29 MG/DL (ref 6–20)
CA-I SERPL ISE-MCNC: 1.13 MMOL/L (ref 1.15–1.33)
CALCIUM UR-MCNC: 8.8 MG/DL (ref 8.5–10.3)
CHLORIDE SERPL-SCNC: 101 MMOL/L (ref 101–111)
CO2 SERPL-SCNC: 30 MMOL/L (ref 21–32)
CREAT SERPLBLD-SCNC: 1.7 MG/DL (ref 0.6–1.2)
EOSINOPHIL # BLD AUTO: 0 10^3/UL (ref 0–0.7)
EOSINOPHIL NFR BLD AUTO: 0 %
ERYTHROCYTE [DISTWIDTH] IN BLOOD BY AUTOMATED COUNT: 13.7 % (ref 12–15)
FOLATE SERPL-MCNC: 10.09 NG/ML
GFRSERPLBLD MDRD-ARVRAT: 39 ML/MIN/{1.73_M2} (ref 89–?)
GLUCOSE SERPL-MCNC: 129 MG/DL (ref 70–100)
HCT VFR BLD AUTO: 39.2 % (ref 42–52)
HGB UR QL STRIP: 12.7 G/DL (ref 14–18)
INR PPP: 1.3 (ref 0.8–1.2)
LYMPHOCYTES # SPEC AUTO: 0.5 10^3/UL (ref 1.5–3.5)
LYMPHOCYTES NFR BLD AUTO: 4.4 %
MCH RBC QN AUTO: 30.2 PG (ref 27–31)
MCHC RBC AUTO-ENTMCNC: 32.4 G/DL (ref 32–36)
MCV RBC AUTO: 93.1 FL (ref 80–94)
MONOCYTES # BLD AUTO: 0.5 10^3/UL (ref 0–1)
MONOCYTES NFR BLD AUTO: 4.8 %
NEUTROPHILS # BLD AUTO: 9.2 10^3/UL (ref 1.5–6.6)
NEUTROPHILS # SNV AUTO: 10.2 X10^3/UL (ref 4.8–10.8)
NEUTROPHILS NFR BLD AUTO: 90.1 %
NRBC # BLD AUTO: 0 /100WBC
NRBC # BLD AUTO: 0 X10^3/UL
PDW BLD AUTO: 11.1 FL (ref 7.4–11.4)
PH BLDV: 7.37 [PH] (ref 7.31–7.41)
PLATELET # BLD: 268 10^3/UL (ref 130–450)
POTASSIUM SERPL-SCNC: 3.6 MMOL/L (ref 3.5–5)
PROTHROM ACT/NOR PPP: 14.6 SECS (ref 9.9–12.6)
RBC MAR: 4.21 10^6/UL (ref 4.7–6.1)
SODIUM SERPLBLD-SCNC: 142 MMOL/L (ref 135–145)
VIT B12 SERPL-MCNC: 1206 PG/ML (ref 180–914)

## 2022-05-11 RX ADMIN — Medication SCH MG: at 08:45

## 2022-05-11 RX ADMIN — DEXAMETHASONE SODIUM PHOSPHATE SCH MG: 4 INJECTION, SOLUTION INTRA-ARTICULAR; INTRALESIONAL; INTRAMUSCULAR; INTRAVENOUS; SOFT TISSUE at 08:41

## 2022-05-11 RX ADMIN — INSULIN ASPART SCH UNIT: 100 INJECTION, SOLUTION INTRAVENOUS; SUBCUTANEOUS at 20:42

## 2022-05-11 RX ADMIN — AZITHROMYCIN MONOHYDRATE SCH MG: 250 TABLET ORAL at 08:45

## 2022-05-11 RX ADMIN — SODIUM CHLORIDE, PRESERVATIVE FREE SCH ML: 5 INJECTION INTRAVENOUS at 16:40

## 2022-05-11 RX ADMIN — INSULIN ASPART SCH UNIT: 100 INJECTION, SOLUTION INTRAVENOUS; SUBCUTANEOUS at 11:44

## 2022-05-11 RX ADMIN — INSULIN ASPART SCH UNIT: 100 INJECTION, SOLUTION INTRAVENOUS; SUBCUTANEOUS at 16:41

## 2022-05-11 RX ADMIN — NICOTINE SCH PATCH: 21 PATCH TRANSDERMAL at 05:46

## 2022-05-11 RX ADMIN — APIXABAN SCH MG: 2.5 TABLET, FILM COATED ORAL at 08:44

## 2022-05-11 RX ADMIN — Medication SCH MCG: at 08:44

## 2022-05-11 RX ADMIN — Medication SCH MG: at 16:41

## 2022-05-11 RX ADMIN — Medication SCH TAB: at 08:45

## 2022-05-11 RX ADMIN — ALBUTEROL SULFATE SCH PUFFS: 90 AEROSOL, METERED RESPIRATORY (INHALATION) at 07:35

## 2022-05-11 RX ADMIN — SODIUM CHLORIDE SCH MLS/HR: 900 INJECTION INTRAVENOUS at 08:43

## 2022-05-11 RX ADMIN — ALBUTEROL SULFATE SCH PUFFS: 90 AEROSOL, METERED RESPIRATORY (INHALATION) at 15:48

## 2022-05-11 RX ADMIN — APIXABAN SCH MG: 2.5 TABLET, FILM COATED ORAL at 20:29

## 2022-05-11 RX ADMIN — DOXAZOSIN SCH MG: 4 TABLET ORAL at 08:48

## 2022-05-11 RX ADMIN — ALBUTEROL SULFATE SCH PUFFS: 90 AEROSOL, METERED RESPIRATORY (INHALATION) at 18:40

## 2022-05-11 RX ADMIN — INSULIN ASPART SCH: 100 INJECTION, SOLUTION INTRAVENOUS; SUBCUTANEOUS at 08:18

## 2022-05-11 RX ADMIN — SODIUM CHLORIDE, PRESERVATIVE FREE SCH ML: 5 INJECTION INTRAVENOUS at 01:17

## 2022-05-11 RX ADMIN — METOPROLOL SUCCINATE SCH MG: 25 TABLET, EXTENDED RELEASE ORAL at 08:44

## 2022-05-11 RX ADMIN — SODIUM CHLORIDE, PRESERVATIVE FREE SCH ML: 5 INJECTION INTRAVENOUS at 08:41

## 2022-05-11 NOTE — PROVIDER PROGRESS NOTE
Subjective





- Prog Note Date


Prog Note Date: 05/11/22





- Subjective


Subjective: 


He feels okay overall.  Feels the same compared to his baseline.  Does not feel 

more short of breath with activity.  He was confused overnight and his wife 

confirmed he does have a little dementia.





Current Medications





- Current Medications


Current Medications: 





Active Medications





Acetaminophen (Acetaminophen 325 Mg Tablet)  650 mg PO Q4HR PRN


   PRN Reason: Pain 1 to 4, or Fever


Albuterol (Albuterol 1 Puff)  2 puffs INH RTQID [facility]


   Last Admin: 05/11/22 07:35 Dose:  2 puffs


   


Allopurinol (Allopurinol 100 Mg Tablet)  300 mg PO DAILY [facility]


   Last Admin: 05/11/22 08:45 Dose:  300 mg


   


Apixaban (Apixaban 2.5 Mg Tablet)  2.5 mg PO BID [facility]


   Last Admin: 05/11/22 08:44 Dose:  2.5 mg


   


Atorvastatin Calcium (Atorvastatin 40 Mg Tablet)  20 mg PO QPM [facility]


Azithromycin (Azithromycin 250 Mg Tablet)  500 mg PO DAILY [facility]


   Stop: 05/12/22 00:01


   Last Admin: 05/11/22 08:45 Dose:  500 mg


   


Cholecalciferol (Cholecalciferol 25 Mcg Tablet)  50 mcg PO DAILY [facility]


   Last Admin: 05/11/22 08:44 Dose:  50 mcg


   


Doxazosin Mesylate (Doxazosin 4 Mg Tablet)  4 mg PO DAILY [facility]


   Last Admin: 05/11/22 08:48 Dose:  4 mg


   


Furosemide (Furosemide 40 Mg Tablet)  40 mg PO DAILY [facility]


   Last Admin: 05/11/22 08:45 Dose:  40 mg


   


Guaifenesin (Guaifenesin 600 Mg Tablet)  600 mg PO BID [facility]


   Last Admin: 05/11/22 08:45 Dose:  600 mg


   


Ceftriaxone Sodium 2 gm/ (Sodium Chloride)  100 mls @ 200 mls/hr IV DAILY [facility]


   Last Infusion: 05/11/22 09:23 Dose:  Infused


   


Insulin Aspart (Insulin Aspart 300 Unit/3 Ml Pen)  1 - 5 unit SUBQ 

0800,1200,1700,2100 [facility]; Protocol


   Last Admin: 05/11/22 11:44 Dose:  1 unit


   


Levalbuterol HCl (Levalbuterol 1.25 Mg/3 Ml Neb)  1.25 mg INH Q4H PRN


   PRN Reason: Shortness of Air/Wheezing


Lisinopril (Lisinopril 20 Mg Tablet)  20 mg PO DAILY [facility]


   Last Admin: 05/11/22 11:53 Dose:  20 mg


   


Metoprolol Succinate (Metoprolol Succinate 25 Mg Tablet)  25 mg PO DAILY [facility]


Multivitamins/Minerals (Multivitamin W/Minerals Tablet)  1 tab PO DAILYWM [facility]


   Last Admin: 05/11/22 08:45 Dose:  1 tab


   


Nicotine (Nicotine 21 Mg Patch)  1 patch TOP 0600 [facility]


   Last Admin: 05/11/22 05:46 Dose:  1 patch


   


Ondansetron HCl (Ondansetron 4 Mg/2 Ml Vial)  4 mg IVP Q6HR PRN


   PRN Reason: Nausea / Vomiting


Saccharomyces Boulardii (Saccharomyces Boulardii 250 Mg Capsule)  250 mg PO 

BIDWM [facility]


   Last Admin: 05/11/22 08:45 Dose:  250 mg


   


Sodium Chloride (Sodium Chloride Flush 0.9% 10 Ml Syringe)  10 ml IVP 

0100,0900,1700 [facility]


   Last Admin: 05/11/22 08:41 Dose:  10 ml


   


Sodium Chloride (Sodium Chloride Flush 0.9% 10 Ml Syringe)  10 ml IVP PRN PRN


   PRN Reason: AS NEEDED PER PROVIDER ORDERS


   Last Admin: 05/10/22 10:02 Dose:  10 ml


   





                                        





Doxazosin [Cardura] 4 mg PO DAILY 01/25/17 


Furosemide 40 mg PO DAILY 01/25/17 


Metoprolol Succinate [Toprol Xl] 25 mg PO DAILY 01/25/17 


allopurinoL [Allopurinol] 300 mg PO DAILY 01/25/17 


Apixaban [Eliquis] 2.5 mg PO BID 05/10/22 











Objective





- Vital Signs/Intake & Output


Reviewed Vital Signs: Yes


Vital Signs: 


                                Vital Signs x48h











  Temp Pulse Pulse Pulse Resp BP Pulse Ox


 


 05/11/22 11:44  36.5 C    89  20  139/102 H  95


 


 05/11/22 11:08   84     


 


 05/11/22 07:50  36.4 C L    80  22  152/80 H  99


 


 05/11/22 07:36   70    18  


 


 05/11/22 05:00  36.4 C L   70   22  153/78 H  97











Intake & Output: 


                                 Intake & Output











 05/08/22 05/09/22 05/10/22 05/11/22





 23:59 23:59 23:59 23:59


 


Intake Total  350 2645.25 650


 


Output Total  168 1039 


 


Balance  182 1606.25 650














- Objective


General Appearance: positive: No acute distress, Alert


Eyes Bilateral: positive: Normal inspection, Conjunctivae nml


ENT: positive: ENT inspection nml


Neck: positive: Nml inspection


Respiratory: positive: No respiratory distress, Other (Diminished.).  negative: 

Wheezes, Rales


Cardiovascular: positive: Irregularly irregular.  negative: Tachycardia


Abdomen: positive: Non-tender, No distention.  negative: Tenderness


Skin: positive: Warm, Dry


Extremities: positive: Pedal edema (Trace nonpitting edema in right lower 

extremity.)


Neurologic/Psychiatric: negative: Disoriented to person, Disoriented to place





- Lab Results


Fish Bones: 


                                 05/11/22 04:42





                                 05/11/22 04:42


Other Labs: 


                               Lab Results x24hrs











  05/11/22 05/11/22 05/11/22 Range/Units





  04:42 04:42 04:42 


 


WBC     (4.8-10.8)  x10^3/uL


 


RBC     (4.70-6.10)  10^6/uL


 


Hgb     (14.0-18.0)  g/dL


 


Hct     (42.0-52.0)  %


 


MCV     (80.0-94.0)  fL


 


MCH     (27.0-31.0)  pg


 


MCHC     (32.0-36.0)  g/dL


 


RDW     (12.0-15.0)  %


 


Plt Count     (130-450)  10^3/uL


 


MPV     (7.4-11.4)  fL


 


Neut # (Auto)     (1.5-6.6)  10^3/uL


 


Lymph # (Auto)     (1.5-3.5)  10^3/uL


 


Mono # (Auto)     (0.0-1.0)  10^3/uL


 


Eos # (Auto)     (0.0-0.7)  10^3/uL


 


Baso # (Auto)     (0.0-0.1)  10^3/uL


 


Absolute Nucleated RBC     x10^3/uL


 


Nucleated RBC %     /100WBC


 


PT    14.6 H  (9.9-12.6)  secs


 


INR    1.3 H  (0.8-1.2)  


 


VBG pH  7.369    (7.31-7.41)  


 


Ionized Calcium  1.13 L    (1.15-1.33)  mmol/L


 


Sodium     (135-145)  mmol/L


 


Potassium     (3.5-5.0)  mmol/L


 


Chloride     (101-111)  mmol/L


 


Carbon Dioxide     (21-32)  mmol/L


 


Anion Gap     (6-13)  


 


BUN     (6-20)  mg/dL


 


Creatinine     (0.6-1.2)  mg/dL


 


Estimated GFR (MDRD)     (>89)  


 


Glucose     ()  mg/dL


 


Calcium     (8.5-10.3)  mg/dL


 


B-Natriuretic Peptide   2072 H   (5-100)  pg/mL














  05/11/22 05/11/22 Range/Units





  04:42 04:42 


 


WBC   10.2  (4.8-10.8)  x10^3/uL


 


RBC   4.21 L  (4.70-6.10)  10^6/uL


 


Hgb   12.7 L  (14.0-18.0)  g/dL


 


Hct   39.2 L  (42.0-52.0)  %


 


MCV   93.1  (80.0-94.0)  fL


 


MCH   30.2  (27.0-31.0)  pg


 


MCHC   32.4  (32.0-36.0)  g/dL


 


RDW   13.7  (12.0-15.0)  %


 


Plt Count   268  (130-450)  10^3/uL


 


MPV   11.1  (7.4-11.4)  fL


 


Neut # (Auto)   9.2 H  (1.5-6.6)  10^3/uL


 


Lymph # (Auto)   0.5 L  (1.5-3.5)  10^3/uL


 


Mono # (Auto)   0.5  (0.0-1.0)  10^3/uL


 


Eos # (Auto)   0.0  (0.0-0.7)  10^3/uL


 


Baso # (Auto)   0.0  (0.0-0.1)  10^3/uL


 


Absolute Nucleated RBC   0.00  x10^3/uL


 


Nucleated RBC %   0.0  /100WBC


 


PT    (9.9-12.6)  secs


 


INR    (0.8-1.2)  


 


VBG pH    (7.31-7.41)  


 


Ionized Calcium    (1.15-1.33)  mmol/L


 


Sodium  142   (135-145)  mmol/L


 


Potassium  3.6   (3.5-5.0)  mmol/L


 


Chloride  101   (101-111)  mmol/L


 


Carbon Dioxide  30   (21-32)  mmol/L


 


Anion Gap  11.0   (6-13)  


 


BUN  29 H   (6-20)  mg/dL


 


Creatinine  1.7 H   (0.6-1.2)  mg/dL


 


Estimated GFR (MDRD)  39 L   (>89)  


 


Glucose  129 H   ()  mg/dL


 


Calcium  8.8   (8.5-10.3)  mg/dL


 


B-Natriuretic Peptide    (5-100)  pg/mL














Assessment/Plan





- Problem List


(1) CAP (community acquired pneumonia)


Impression: 


Imaging suggested right lower lobe infiltrate and his presentation is consistent

 with pneumonia.  He is also COVID-positive.  He is now on room air after 

previously being on 2 L of oxygen.  Today's last day of azithromycin but we will

 continue ceftriaxone.  If he remains on room air throughout the day then we 

will plan for discharge tomorrow morning on oral antibiotics.








(2) COVID-19


Impression: 


He is COVID-positive he is not vaccinated.  He is no longer hypoxic so we will 

discontinue the Decadron.  If he is stable then we will plan for discharge home 

tomorrow as mentioned above.  Continue contact precautions.








(3) Atrial fibrillation with RVR


Impression: 


He remains rate controlled on metoprolol.  At speaking his wife today, he had 

run out of his metoprolol for the past couple of weeks which likely triggered 

the rapid ventricular response as well as the pneumonia.  Continue metoprolol 

and Eliquis.








(4) TABITHA (acute kidney injury)


Impression: 


Once again, it is not clear that his acute kidney injury or chronic kidney 

disease.  His wife does not believe he has a history of kidney disease but he 

has not had labs in many years.  His creatinine has been stable at around 1.5 

suspect this is likely chronic kidney disease at this point.  We will resume his

 home lisinopril today and recheck renal function in the morning.  Either way, 

he will need close follow-up on discharge to monitor his renal function.








(5) COPD (chronic obstructive pulmonary disease)


Impression: 


This is stable and not in exacerbation.  We will continue with albuterol 4 times

 daily.  We will discharge him on Spiriva and he can continue his home 

nebulizers on discharge.  We did an exercise desaturation study today and he was

 saturating in the mid 90s on room air.








(6) Suspected CHF (congestive heart failure)


Impression: 


We suspect he likely has heart failure given elevated BNP.  X-ray does not 

really suggest pulmonary edema.  His BNP is decreased today to 2000 and he does 

not appear to be overtly edematous.  We will continue his home dose of Lasix.  

Continue Toprol and lisinopril. Continue with daily weights.








(7) Edema of right lower extremity


Impression: 


Duplex negative for DVT.  Doubt this is due to heart failure given the 

asymmetric edema.  We will continue to monitor.








(8) Decreased thyroid stimulating hormone (TSH) level


Impression: 


His TSH is slightly decreased and free T4 levels are minimally elevated.  T3 was

 within normal limits.  Suspect this is likely due to his acute illness rather 

than true hyperthyroidism.  We will recommend that he have repeat labs in 4 

weeks for reassessment.








(9) Cognitive impairment


Impression: 


His wife confirms that he has had cognitive impairment over the past 2 months or

 so where he has become a little more forgetful.  He has been forgetful here and

 has had evidence of delirium overnight when he becomes a little more agitated. 

 We will check a vitamin B12 and folate level.  Delirium precautions.  We will 

look to discharge him home tomorrow as he would likely do better in his home 

environment.








(10) Hx of coronary artery disease


Impression: 


Stable.  His troponins were only mildly elevated and this is likely due to 

demand ischemia secondary to the pneumonia.  His EKG did not suggest ischemia.  

We are continuing his home Eliquis, metoprolol, and statin.








(11) Type 2 diabetes mellitus


Impression: 


His A1c is 6.5% and his blood glucose has been relatively well controlled 

despite the use of Decadron.  Now that Decadron has been discontinued, his blood

 pressure continue to improve.  We will continue sliding scale and carb 

controlled diet.








(12) BPH (benign prostatic hyperplasia)


Impression: 


Continue doxazosin.

## 2022-05-12 VITALS — DIASTOLIC BLOOD PRESSURE: 87 MMHG | SYSTOLIC BLOOD PRESSURE: 146 MMHG

## 2022-05-12 LAB
ANION GAP SERPL CALCULATED.4IONS-SCNC: 13 MMOL/L (ref 6–13)
BASOPHILS NFR BLD AUTO: 0 10^3/UL (ref 0–0.1)
BASOPHILS NFR BLD AUTO: 0.1 %
BUN SERPL-MCNC: 39 MG/DL (ref 6–20)
CALCIUM UR-MCNC: 8.6 MG/DL (ref 8.5–10.3)
CHLORIDE SERPL-SCNC: 102 MMOL/L (ref 101–111)
CO2 SERPL-SCNC: 26 MMOL/L (ref 21–32)
CREAT SERPLBLD-SCNC: 1.7 MG/DL (ref 0.6–1.2)
EOSINOPHIL # BLD AUTO: 0 10^3/UL (ref 0–0.7)
EOSINOPHIL NFR BLD AUTO: 0 %
ERYTHROCYTE [DISTWIDTH] IN BLOOD BY AUTOMATED COUNT: 14 % (ref 12–15)
GFRSERPLBLD MDRD-ARVRAT: 39 ML/MIN/{1.73_M2} (ref 89–?)
GLUCOSE SERPL-MCNC: 162 MG/DL (ref 70–100)
HCT VFR BLD AUTO: 37.6 % (ref 42–52)
HGB UR QL STRIP: 12.1 G/DL (ref 14–18)
LYMPHOCYTES # SPEC AUTO: 0.4 10^3/UL (ref 1.5–3.5)
LYMPHOCYTES NFR BLD AUTO: 4 %
MCH RBC QN AUTO: 30.4 PG (ref 27–31)
MCHC RBC AUTO-ENTMCNC: 32.2 G/DL (ref 32–36)
MCV RBC AUTO: 94.5 FL (ref 80–94)
MONOCYTES # BLD AUTO: 0.5 10^3/UL (ref 0–1)
MONOCYTES NFR BLD AUTO: 5.3 %
NEUTROPHILS # BLD AUTO: 7.9 10^3/UL (ref 1.5–6.6)
NEUTROPHILS # SNV AUTO: 8.8 X10^3/UL (ref 4.8–10.8)
NEUTROPHILS NFR BLD AUTO: 89.9 %
NRBC # BLD AUTO: 0 /100WBC
NRBC # BLD AUTO: 0 X10^3/UL
PDW BLD AUTO: 11.5 FL (ref 7.4–11.4)
PLATELET # BLD: 254 10^3/UL (ref 130–450)
POTASSIUM SERPL-SCNC: 3.3 MMOL/L (ref 3.5–5)
RBC MAR: 3.98 10^6/UL (ref 4.7–6.1)
SODIUM SERPLBLD-SCNC: 141 MMOL/L (ref 135–145)

## 2022-05-12 RX ADMIN — Medication SCH MCG: at 08:29

## 2022-05-12 RX ADMIN — Medication SCH TAB: at 08:29

## 2022-05-12 RX ADMIN — INSULIN ASPART SCH UNIT: 100 INJECTION, SOLUTION INTRAVENOUS; SUBCUTANEOUS at 11:50

## 2022-05-12 RX ADMIN — SODIUM CHLORIDE, PRESERVATIVE FREE SCH ML: 5 INJECTION INTRAVENOUS at 00:46

## 2022-05-12 RX ADMIN — NICOTINE SCH PATCH: 21 PATCH TRANSDERMAL at 06:05

## 2022-05-12 RX ADMIN — Medication SCH MG: at 08:30

## 2022-05-12 RX ADMIN — INSULIN ASPART SCH: 100 INJECTION, SOLUTION INTRAVENOUS; SUBCUTANEOUS at 08:36

## 2022-05-12 RX ADMIN — ALBUTEROL SULFATE SCH PUFFS: 90 AEROSOL, METERED RESPIRATORY (INHALATION) at 11:14

## 2022-05-12 RX ADMIN — SODIUM CHLORIDE, PRESERVATIVE FREE SCH ML: 5 INJECTION INTRAVENOUS at 08:29

## 2022-05-12 RX ADMIN — APIXABAN SCH MG: 2.5 TABLET, FILM COATED ORAL at 08:29

## 2022-05-12 RX ADMIN — ALBUTEROL SULFATE SCH PUFFS: 90 AEROSOL, METERED RESPIRATORY (INHALATION) at 07:14

## 2022-05-12 RX ADMIN — DOXAZOSIN SCH MG: 4 TABLET ORAL at 08:30

## 2022-05-12 NOTE — DISCHARGE PLAN
Discharge Plan


Problem Reviewed?: Yes


Disposition: 06 Home Health Service


Condition: Stable


Prescriptions: 


cefUROXime axetiL [Ceftin] 500 mg PO BID #6 tablet


Tiotropium Bromide [Spiriva] 1 puffs INH DAILY 30 Days #30 each


Metoprolol Succinate [Toprol Xl] 25 mg PO BID #60 tablet


lisinopriL [Zestril] 20 mg PO DAILY #30 tablet


Simvastatin [Zocor] 40 mg PO HS #30 tablet


Diet: Diabetic


Activity Restrictions: Activity as Tolerated


Assistance Devices: Walker


Instruction Topics:  Simvastatin tablets, Cefuroxime tablets, Metoprolol 

tablets, Tiotropium inhalation powder, Pneumonia Dc


Health Concerns: 


You were admitted to the hospital because of shortness of breath due to 

pneumonia.  You are also found to be infected with the COVID-19 virus.  Which 

usually with antibiotics for the pneumonia as well as steroids for 2 days given 

the COVID-19 infection.  You have had improvement in your symptoms and you are 

now back to your baseline.  We will be discharging you with oral antibiotics for

 2 more days.





You were also found to have an elevated heart rate due to atrial fibrillation.  

This is a chronic problem for you and likely your heart rate was elevated 

because you ran out of your metoprolol and because of the stress on your heart 

from the pneumonia.  Your heart rate has since been well controlled and we have 

increased your metoprolol to twice a day.


Plan of Treatment: 


Please take Ceftin 500 mg twice a day for 2 more days.  Last day will be the 

evening of May 13.





I have sent a new prescription for metoprolol.  Please take it 25 mg twice a 

day.  I have also sent a new prescription for lisinopril 20 mg once a day.





I have also sent a prescription for Spiriva which is an inhaler to take once a 

day to help treat your underlying COPD.  You may continue with your nebulizer 

treatments as needed.





Please follow-up with your primary care physician next week to ensure you are 

doing well from a breathing perspective.  They should check your kidney function

 to ensure that your numbers are stable.  They should also check your thyroid 

numbers in 4 weeks.


Care Goals: 


The goal is to treat underlying pneumonia and to prevent future exacerbations of

 your COPD.


Assessment: 


The patient and family are agreeable to the plan.


Additional Instructions or Follow Up instructions: 


Please follow-up with your primary care physician next week.  They should check 

labs to ensure your kidney function is stable.  They will also need to check 

your thyroid numbers in 4 weeks.


Follow-Up Care: Home Health - PT, Home Health - OT


No Smoking: If you smoke, Please STOP!  Call [phone] for help.


Follow-up with: 


Portia Montalvo PA-C [Physician No Access] -

## 2022-05-12 NOTE — DISCHARGE SUMMARY
Discharge Summary


Admit Date: 05/09/22


Discharge Date: 05/12/22


Discharging Provider: Casimiro Arroyo


Primary Care Provider: Portia Montalvo


Code Status: Do Not Attempt Resuscitation


Condition at Discharge: Stable


Discharge Disposition: [facility]





- DIAGNOSES


Admission Diagnoses: 


Atrial fibrillation with rapid ventricular response


Community-acquired pneumonia


COVID-19


COPD exacerbation


Sleep apnea


Type 2 diabetes


Acute kidney injury


Peripheral vascular disease


Hypertension


History of coronary artery disease


Tobacco abuse


Hypokalemia


Hypomagnesemia


BPH


Discharge Diagnoses with Status of Each Condition: 


Community-acquired pneumonia - improved.


COVID-19 - improved.


Atrial fibrillation with rapid ventricular response - resolved.


Acute kidney injury, likely chronic kidney disease - stable.


COPD - stable.


Suspected CHF - stable.


Edema right lower extremity - stable.


Decreased TSH level - stable.


Cognitive impairment - stable.


History of coronary artery disease - stable.


Type 2 diabetes mellitus - stable.


BPH - stable.


SIDNEY on CPAP - stable.





- HPI


History of Present Illness: 


H&P per Dr. Haynes:





This is an 86-year-old white male with a history of 2 pack/day smoking since the

age of 16, COPD on home oxygen, sleep apnea on a CPAP machine (possibly a 

trilogy machine), CAD with MI and coronary stenting in 2012, chronic atrial fib 

on Coumadin, PVD with renal artery stent in the right done 2009 and bilateral 

iliac stents, and has carotid blockage of 50 to 70%. He was diagnosed with 

diabetes mellitus about 5 years ago and was hospitalized here foraltered mental 

status caused by HONK with glucose greater than 1000 in January 2017. He also 

has prostate disease, gout, hypertension, hyperlipidemia and status post 

cholecystectomy.


An ambulance was called due to shortness of breath and the patient gave a vague 

history in the ED that he has been short of breath for days and that "the girls 

made him come in to the ER". He described a cough with minimal sputum 

production, denies a fever.  In the ED he was found to be in A. fib with RVR 

with rates in the 140s.  He received diltiazem IV twice and heart rate improved 

to 105-110.  His exam showed left basilar rales and chest x-ray showed a left 

lower lobe pneumonia. He is COVID-positive.  His BNP is 1900 with first troponin

is 47.  Exam also showed right leg swelling and he underwent a Doppler of that 

leg that showed no DVT. A CTA of the chest has been done but results are still 

pending.  The ED provider reached out to have the patient admitted to the 

Hospitalist team for managing his A. fib with RVR, community-acquired pneumonia,

COPD, posdsibly CHF and a COVID-pneumonia.


He has a POLST signed in 2013 that indicates he wants DNR, DNI and selective 

medical treatment, preferably no ICU and preferably no transfers.





- CONSULTS | PROCEDURES


Consultations: PT, OT.





- HOSPITAL COURSE


Hospital Course: 


The patient was admitted to intensive care unit due to atrial fibrillation with 

RVR.  He was started on a diltiazem drip.  He was also started on ceftriaxone 

azithromycin for the community-acquired pneumonia.  He was started on Decadron 

given he was also hypoxic and was positive for COVID-19.  He was then attention 

to oral metoprolol and it was later found out up speaking to his wife that he 

had been out of his metoprolol for a couple of weeks which in addition to the 

pneumonia, likely triggered the rapid ventricular response.  He was weaned off 

of the diltiazem drip and transfer out of the ICU within 24 hours.  His oxygen 

requirements decreased and he was down to room air within 48 hours.  He was 

observed 1 more night and continue to do well.  An exercise desaturation test 

revealed that he does not need any oxygen at home.  He was also noted to have 

acute kidney injury on admission but this was later felt to likely be chronic 

kidney disease.  We did check a TSH level given the A. fib and it was slightly 

decreased.  Free T4 was checked which was mildly elevated but T3 was within 

normal limits.  This was felt to be secondary to his acute illness.  I discussed

this with his wife given the patient has cognitive impairment at baseline.  I 

recommended that he have repeat TSH in 3 to 4 weeks and to have his renal 

function checked next week to ensure his kidney function is stable.  She 

expressed understanding of this.  He was discharged on Ceftin to take for 2 more

days to complete 5 days of therapy for the pneumonia.  He was also evaluated by 

PT and OT and home health was recommended and so this was prescribed.





- ALLERGIES


Allergies/Adverse Reactions: 


                                    Allergies











Allergy/AdvReac Type Severity Reaction Status Date / Time


 


ibuprofen Allergy  Hives Verified 05/09/22 18:20














- MEDICATIONS


Home Medications: 


                                Ambulatory Orders











 Medication  Instructions  Recorded  Confirmed


 


Doxazosin [Cardura] 4 mg PO DAILY 01/25/17 05/10/22


 


Furosemide 40 mg PO DAILY 01/25/17 05/10/22


 


allopurinoL [Allopurinol] 300 mg PO DAILY 01/25/17 05/10/22


 


Apixaban [Eliquis] 2.5 mg PO BID 05/10/22 05/10/22


 


Metoprolol Succinate [Toprol Xl] 25 mg PO BID #60 tablet 05/12/22 


 


Simvastatin [Zocor] 40 mg PO HS #30 tablet 05/12/22 


 


Tiotropium Bromide [Spiriva] 1 puffs INH DAILY 30 Days #30 each 05/12/22 


 


cefUROXime axetiL [Ceftin] 500 mg PO BID #6 tablet 05/12/22 


 


lisinopriL [Zestril] 20 mg PO DAILY #30 tablet 05/12/22 














- PHYSICAL EXAM AT DISCHARGE


General Appearance: positive: No acute distress, Alert


Eyes Bilateral: positive: Normal inspection, Conjunctivae nml


ENT: positive: ENT inspection nml


Neck: positive: Nml inspection


Respiratory: positive: No respiratory distress, Other (Diminished in bases.).  

negative: Wheezes, Rales


Cardiovascular: positive: Irregularly irregular.  negative: Tachycardia, 

Systolic murmur


Abdomen: positive: Non-tender, No distention.  negative: Tenderness


Skin: positive: Warm, Dry


Extremities: positive: Pedal edema (Trace nonpitting in right lower extremity.)


Neurologic/Psychiatric: positive: Other (No focal deficits.).  negative: 

Disoriented to person, Disoriented to place


Physical Exam Other/Comments: 








                               Vital Signs - 24 hr











  05/11/22 05/11/22 05/12/22





  18:40 20:27 00:07


 


Temperature  36.4 C L 36.4 C L


 


Heart Rate 83  


 


Heart Rate [  92 80





Brachial]   


 


Respiratory 20 17 22





Rate   


 


Blood Pressure   


 


Blood Pressure  120/76 144/73 H





[Right Brachial   





artery]   


 


O2 Saturation  95 95














  05/12/22 05/12/22 05/12/22





  01:55 01:59 02:05


 


Temperature   


 


Heart Rate 100  


 


Heart Rate [   99





Brachial]   


 


Respiratory 24  





Rate   


 


Blood Pressure  133/88 H 


 


Blood Pressure   120/62





[Right Brachial   





artery]   


 


O2 Saturation   98














  05/12/22 05/12/22 05/12/22





  02:10 02:15 02:30


 


Temperature   


 


Heart Rate   


 


Heart Rate [ 95 92 88





Brachial]   


 


Respiratory   





Rate   


 


Blood Pressure   


 


Blood Pressure 118/80 122/77 134/72 H





[Right Brachial   





artery]   


 


O2 Saturation 100 98 100














  05/12/22 05/12/22 05/12/22





  02:45 03:00 05:00


 


Temperature   36.5 C


 


Heart Rate   


 


Heart Rate [ 82 84 72





Brachial]   


 


Respiratory   20





Rate   


 


Blood Pressure   


 


Blood Pressure 136/79 H 112/70 132/85 H





[Right Brachial   





artery]   


 


O2 Saturation 100 100 98














  05/12/22 05/12/22 05/12/22





  07:19 08:06 11:15


 


Temperature  36.5 C 36.4 C L


 


Heart Rate 70  78


 


Heart Rate [  78 84





Brachial]   


 


Respiratory 20 20 20





Rate   


 


Blood Pressure   


 


Blood Pressure  148/64 H 146/87 H





[Right Brachial   





artery]   


 


O2 Saturation  98 94








                                     Oxygen











O2 Source                      Room air


 


Oxygen Flow Rate               2

















- LABS


Result Diagrams: 


                                 05/12/22 04:49





                                 05/12/22 04:49





- DIAGNOSTIC IMAGING


Diagnostic Imaging Results: Final report reviewed





- FOLLOW UP


Follow Up: 


He will need follow-up with his primary care physician in 1 to 2 weeks.  He will

need repeat TSH in 3 to 4 weeks as well as BMP in 1 week to ensure his renal 

function stable.





- TIME SPENT


Time Spent in Discharge (Minutes): 36

## 2022-06-08 ENCOUNTER — HOSPITAL ENCOUNTER (OUTPATIENT)
Dept: HOSPITAL 76 - DI | Age: 87
Discharge: HOME | End: 2022-06-08
Attending: FAMILY MEDICINE
Payer: MEDICARE

## 2022-06-08 DIAGNOSIS — J90: ICD-10-CM

## 2022-06-08 DIAGNOSIS — J18.9: Primary | ICD-10-CM

## 2022-06-08 NOTE — XRAY REPORT
PROCEDURE:  Chest 2 View X-Ray

 

INDICATIONS:  PNEUMONIA

 

TECHNIQUE:  2 view(s) of the chest.  

 

COMPARISON:  CXR earlier today, 5/9/2022. CT pulmonary angiogram 5/9/2022.

 

FINDINGS:  

 

Surgical changes and devices:  None.  

 

Lungs and pleura: Small left pleural effusion. No pneumothorax. Mild retrocardiac opacity.  

 

Mediastinum:  Mediastinal contours are normal.  Heart size is normal.  

 

Bones and chest wall:  No suspicious bony abnormalities.  Soft tissues appear unremarkable.  

 

IMPRESSION:  

Mild retrocardiac opacity. Small left pleural effusion.

 

Reviewed by: Denny Ortiz MD on 6/8/2022 1:49 PM PDT

Approved by: Denny Ortiz MD on 6/8/2022 1:49 PM PDT

 

 

Station ID:  [id number]

## 2022-10-15 ENCOUNTER — HOSPITAL ENCOUNTER (INPATIENT)
Dept: HOSPITAL 76 - ED | Age: 87
LOS: 6 days | Discharge: HOME HEALTH | DRG: 280 | End: 2022-10-21
Attending: INTERNAL MEDICINE | Admitting: INTERNAL MEDICINE
Payer: MEDICARE

## 2022-10-15 ENCOUNTER — HOSPITAL ENCOUNTER (OUTPATIENT)
Dept: HOSPITAL 76 - EMS | Age: 87
Discharge: ANOTHER HOSPITAL | End: 2022-10-15
Payer: COMMERCIAL

## 2022-10-15 DIAGNOSIS — R53.1: Primary | ICD-10-CM

## 2022-10-15 DIAGNOSIS — G47.30: ICD-10-CM

## 2022-10-15 DIAGNOSIS — F03.90: ICD-10-CM

## 2022-10-15 DIAGNOSIS — Z20.822: ICD-10-CM

## 2022-10-15 DIAGNOSIS — Z95.5: ICD-10-CM

## 2022-10-15 DIAGNOSIS — R51.9: ICD-10-CM

## 2022-10-15 DIAGNOSIS — I11.0: Primary | ICD-10-CM

## 2022-10-15 DIAGNOSIS — I27.81: ICD-10-CM

## 2022-10-15 DIAGNOSIS — I25.10: ICD-10-CM

## 2022-10-15 DIAGNOSIS — I65.23: ICD-10-CM

## 2022-10-15 DIAGNOSIS — I13.0: ICD-10-CM

## 2022-10-15 DIAGNOSIS — N18.9: ICD-10-CM

## 2022-10-15 DIAGNOSIS — I50.23: ICD-10-CM

## 2022-10-15 DIAGNOSIS — Z79.01: ICD-10-CM

## 2022-10-15 DIAGNOSIS — I21.A1: ICD-10-CM

## 2022-10-15 DIAGNOSIS — R63.8: ICD-10-CM

## 2022-10-15 DIAGNOSIS — R06.02: ICD-10-CM

## 2022-10-15 DIAGNOSIS — F17.200: ICD-10-CM

## 2022-10-15 DIAGNOSIS — I50.9: ICD-10-CM

## 2022-10-15 DIAGNOSIS — E11.51: ICD-10-CM

## 2022-10-15 DIAGNOSIS — N40.0: ICD-10-CM

## 2022-10-15 DIAGNOSIS — I25.2: ICD-10-CM

## 2022-10-15 DIAGNOSIS — I27.20: ICD-10-CM

## 2022-10-15 DIAGNOSIS — E87.6: ICD-10-CM

## 2022-10-15 DIAGNOSIS — R53.83: ICD-10-CM

## 2022-10-15 DIAGNOSIS — I48.20: ICD-10-CM

## 2022-10-15 DIAGNOSIS — R09.02: ICD-10-CM

## 2022-10-15 DIAGNOSIS — E11.22: ICD-10-CM

## 2022-10-15 DIAGNOSIS — J43.9: ICD-10-CM

## 2022-10-15 DIAGNOSIS — I48.91: ICD-10-CM

## 2022-10-15 DIAGNOSIS — M10.9: ICD-10-CM

## 2022-10-15 DIAGNOSIS — K21.9: ICD-10-CM

## 2022-10-15 DIAGNOSIS — E78.5: ICD-10-CM

## 2022-10-15 DIAGNOSIS — J44.9: ICD-10-CM

## 2022-10-15 DIAGNOSIS — F17.213: ICD-10-CM

## 2022-10-15 DIAGNOSIS — Z66: ICD-10-CM

## 2022-10-15 DIAGNOSIS — R41.0: ICD-10-CM

## 2022-10-15 LAB
ALBUMIN DIAFP-MCNC: 3.5 G/DL (ref 3.2–5.5)
ALBUMIN/GLOB SERPL: 1.1 {RATIO} (ref 1–2.2)
ALP SERPL-CCNC: 209 IU/L (ref 42–121)
ALT SERPL W P-5'-P-CCNC: 15 IU/L (ref 10–60)
ANION GAP SERPL CALCULATED.4IONS-SCNC: 10 MMOL/L (ref 6–13)
AST SERPL W P-5'-P-CCNC: 31 IU/L (ref 10–42)
BASOPHILS NFR BLD AUTO: 0 10^3/UL (ref 0–0.1)
BASOPHILS NFR BLD AUTO: 0.7 %
BILIRUB BLD-MCNC: 1.8 MG/DL (ref 0.2–1)
BUN SERPL-MCNC: 28 MG/DL (ref 6–20)
CALCIUM UR-MCNC: 9 MG/DL (ref 8.5–10.3)
CHLORIDE SERPL-SCNC: 103 MMOL/L (ref 101–111)
CO2 SERPL-SCNC: 31 MMOL/L (ref 21–32)
CREAT SERPLBLD-SCNC: 1.8 MG/DL (ref 0.6–1.2)
EOSINOPHIL # BLD AUTO: 0.1 10^3/UL (ref 0–0.7)
EOSINOPHIL NFR BLD AUTO: 2 %
ERYTHROCYTE [DISTWIDTH] IN BLOOD BY AUTOMATED COUNT: 15.3 % (ref 12–15)
GFRSERPLBLD MDRD-ARVRAT: 36 ML/MIN/{1.73_M2} (ref 89–?)
GLOBULIN SER-MCNC: 3.1 G/DL (ref 2.1–4.2)
GLUCOSE SERPL-MCNC: 130 MG/DL (ref 70–100)
HCT VFR BLD AUTO: 37 % (ref 42–52)
HGB UR QL STRIP: 11.5 G/DL (ref 14–18)
LIPASE SERPL-CCNC: 34 U/L (ref 22–51)
LYMPHOCYTES # SPEC AUTO: 0.7 10^3/UL (ref 1.5–3.5)
LYMPHOCYTES NFR BLD AUTO: 13.2 %
MCH RBC QN AUTO: 30.9 PG (ref 27–31)
MCHC RBC AUTO-ENTMCNC: 31.1 G/DL (ref 32–36)
MCV RBC AUTO: 99.5 FL (ref 80–94)
MONOCYTES # BLD AUTO: 0.5 10^3/UL (ref 0–1)
MONOCYTES NFR BLD AUTO: 9.1 %
NEUTROPHILS # BLD AUTO: 4.1 10^3/UL (ref 1.5–6.6)
NEUTROPHILS # SNV AUTO: 5.5 X10^3/UL (ref 4.8–10.8)
NEUTROPHILS NFR BLD AUTO: 74.8 %
NRBC # BLD AUTO: 0 /100WBC
NRBC # BLD AUTO: 0 X10^3/UL
PDW BLD AUTO: 10.1 FL (ref 7.4–11.4)
PLATELET # BLD: 159 10^3/UL (ref 130–450)
POTASSIUM SERPL-SCNC: 3.1 MMOL/L (ref 3.5–5)
PROT SPEC-MCNC: 6.6 G/DL (ref 6.7–8.2)
RBC MAR: 3.72 10^6/UL (ref 4.7–6.1)
SODIUM SERPLBLD-SCNC: 144 MMOL/L (ref 135–145)

## 2022-10-15 PROCEDURE — 84484 ASSAY OF TROPONIN QUANT: CPT

## 2022-10-15 PROCEDURE — 83721 ASSAY OF BLOOD LIPOPROTEIN: CPT

## 2022-10-15 PROCEDURE — 80053 COMPREHEN METABOLIC PANEL: CPT

## 2022-10-15 PROCEDURE — 71045 X-RAY EXAM CHEST 1 VIEW: CPT

## 2022-10-15 PROCEDURE — 94761 N-INVAS EAR/PLS OXIMETRY MLT: CPT

## 2022-10-15 PROCEDURE — 96375 TX/PRO/DX INJ NEW DRUG ADDON: CPT

## 2022-10-15 PROCEDURE — 83036 HEMOGLOBIN GLYCOSYLATED A1C: CPT

## 2022-10-15 PROCEDURE — 87040 BLOOD CULTURE FOR BACTERIA: CPT

## 2022-10-15 PROCEDURE — 99285 EMERGENCY DEPT VISIT HI MDM: CPT

## 2022-10-15 PROCEDURE — 97530 THERAPEUTIC ACTIVITIES: CPT

## 2022-10-15 PROCEDURE — 70450 CT HEAD/BRAIN W/O DYE: CPT

## 2022-10-15 PROCEDURE — 83735 ASSAY OF MAGNESIUM: CPT

## 2022-10-15 PROCEDURE — 84443 ASSAY THYROID STIM HORMONE: CPT

## 2022-10-15 PROCEDURE — 83605 ASSAY OF LACTIC ACID: CPT

## 2022-10-15 PROCEDURE — 97162 PT EVAL MOD COMPLEX 30 MIN: CPT

## 2022-10-15 PROCEDURE — 85027 COMPLETE CBC AUTOMATED: CPT

## 2022-10-15 PROCEDURE — 99406 BEHAV CHNG SMOKING 3-10 MIN: CPT

## 2022-10-15 PROCEDURE — 83880 ASSAY OF NATRIURETIC PEPTIDE: CPT

## 2022-10-15 PROCEDURE — 93005 ELECTROCARDIOGRAM TRACING: CPT

## 2022-10-15 PROCEDURE — 94640 AIRWAY INHALATION TREATMENT: CPT

## 2022-10-15 PROCEDURE — 93306 TTE W/DOPPLER COMPLETE: CPT

## 2022-10-15 PROCEDURE — 85025 COMPLETE CBC W/AUTO DIFF WBC: CPT

## 2022-10-15 PROCEDURE — 97116 GAIT TRAINING THERAPY: CPT

## 2022-10-15 PROCEDURE — 80061 LIPID PANEL: CPT

## 2022-10-15 PROCEDURE — 36415 COLL VENOUS BLD VENIPUNCTURE: CPT

## 2022-10-15 PROCEDURE — 99284 EMERGENCY DEPT VISIT MOD MDM: CPT

## 2022-10-15 PROCEDURE — 80048 BASIC METABOLIC PNL TOTAL CA: CPT

## 2022-10-15 PROCEDURE — 87635 SARS-COV-2 COVID-19 AMP PRB: CPT

## 2022-10-15 PROCEDURE — 84100 ASSAY OF PHOSPHORUS: CPT

## 2022-10-15 PROCEDURE — 97166 OT EVAL MOD COMPLEX 45 MIN: CPT

## 2022-10-15 PROCEDURE — 83690 ASSAY OF LIPASE: CPT

## 2022-10-15 PROCEDURE — 96374 THER/PROPH/DIAG INJ IV PUSH: CPT

## 2022-10-15 NOTE — XRAY REPORT
PROCEDURE:  Chest 1 View X-Ray

 

INDICATIONS:  ITS.REASON: SOA

 

TECHNIQUE:  One view of the chest was acquired.  

 

COMPARISON:  Prior chest plain film 1/8/2022 and CT angiogram chest 5/9/2022

 

FINDINGS:  

 

Surgical changes and devices:  None.  

 

Lungs and pleura:  There likely are bilateral subpulmonic pleural effusions but no pneumothorax.  Liane
gs are edematous, slightly greater on the left than the right and involve diffusely..  

 

Mediastinum:  Mediastinal contours appear normal.  Heart size is moderately enlarged.  

 

Bones and chest wall:  No suspicious bony lesions.  Overlying soft tissues appear unremarkable.  

 

 

IMPRESSION:  

 

Acute exacerbation of chronic CHF.

 

Reviewed by: Wil Monahan MD on 10/15/2022 10:28 PM PDT

Approved by: Wil Monahan MD on 10/15/2022 10:28 PM PDT

 

 

Station ID:  [id number]

## 2022-10-15 NOTE — ED PHYSICIAN DOCUMENTATION
PD HPI DYSPNEA





- Stated complaint


Stated Complaint: SOA, WEAKNESS





- Chief complaint


Chief Complaint: Resp





- History obtained from


History obtained from: Patient, EMS





- Additional information


Additional information: 


Patient is an 86-year-old male presenting for evaluation of shortness of breath.

 Is unclear how long his symptoms have been going on for but patient reports at 

least a few days.  He is supposed to be taking Lasix and reports that 10 weeks 

ago his doctor changed it to every other day dosing.He has been using it and 

reports normal urination.  He does have lower extremity swelling but he is 

unsure if his swelling is worse than normal today.  He denies chest pain.  He 

reports nonproductive cough.  He lives in a trailer with his wife and states 

that he does not ambulate much.He does take Eliquis for history of A. fib.EMS 

did administer a DuoNeb.  They also noted he was 86% on room air.  Patient 

reports feeling better with oxygen on.








Review of Systems


Constitutional: denies: Fever


Nose: denies: Congestion


Cardiac: denies: Chest pain / pressure


Respiratory: reports: Dyspnea, Cough


GI: denies: Abdominal Pain, Vomiting


: denies: Dysuria


Musculoskeletal: reports: Extremity swelling


Neurologic: denies: Headache





PD PAST MEDICAL HISTORY





- Past Medical History


Cardiovascular: Congestive heart failure, Hypertension, Coronary artery disease,

Peripheral Vascular Disease, MI, Atrial fibrillation


Respiratory: COPD, Sleep apnea, CPAP use


Neuro: Dementia


Endocrine/Autoimmune: Type 2 diabetes


GI: GERD, Ulcers, Hemorrhoids


: Incontinence


HEENT: Chronic hearing loss


Psych: None


Musculoskeletal: Gout


Derm: None





- Past Surgical History


Past Surgical History: Yes


General: Cholecystectomy


Cardiovascular: Coronary stent





- Present Medications


Home Medications: 


                                Ambulatory Orders











 Medication  Instructions  Recorded  Confirmed


 


Furosemide 40 mg PO DAILY 01/25/17 10/15/22


 


allopurinoL [Allopurinol] 300 mg PO DAILY 01/25/17 10/15/22


 


Apixaban [Eliquis] 2.5 mg PO BID 05/10/22 10/15/22


 


Metoprolol Succinate [Toprol Xl] 25 mg PO BID #60 tablet 05/12/22 10/15/22


 


Simvastatin [Zocor] 40 mg PO HS #30 tablet 05/12/22 10/15/22


 


cefUROXime axetiL [Ceftin] 500 mg PO BID #6 tablet 05/12/22 


 


lisinopriL [Zestril] 20 mg PO DAILY #30 tablet 05/12/22 10/15/22


 


Albuterol 2.5 mg INH Q4H PRN 10/15/22 10/15/22














- Allergies


Allergies/Adverse Reactions: 


                                    Allergies











Allergy/AdvReac Type Severity Reaction Status Date / Time


 


ibuprofen Allergy  Hives Verified 10/15/22 21:37














- Social History


Does the pt smoke?: Yes


Smoking Status: Current every day smoker


Does the pt drink ETOH?: No


Does the pt have substance abuse?: No





- Immunizations


Immunizations: TDAP >10years/unknown





- POLST


Patient has POLST: Yes


POLST Status: DNR





PD ED PE NORMAL





- General


General: Alert and oriented X 3, No acute distress, Other (Elderly, frail-

appearing; Tachypneic)





- HEENT


HEENT: Atraumatic





- Neck


Neck: Supple, no meningeal sign





- Cardiac


Cardiac: Other (Tachycardic, irregularly irregular)





- Respiratory


Respiratory: Other (Mild tachypnea, diminished breath sounds at the bases, cra

ckles)





- Abdomen


Abdomen: Soft, Non tender





- Derm


Derm: Warm and dry





- Extremities


Extremities: Other (Bilateral lower extremity edema)





- Neuro


Neuro: Normal speech





Results





- Vitals


Vitals: 


                               Vital Signs - 24 hr











  10/15/22 10/15/22 10/15/22





  21:33 21:51 23:05


 


Temperature 36.8 C  


 


Heart Rate 76 123 H 114 H


 


Respiratory 29 H 22 22





Rate   


 


Blood Pressure 111/83 H 126/81 H 122/89 H


 


O2 Saturation 93 99 99


 


If not protocol  3 3





: Oxygen Flow,   





liters/minute   














  10/16/22 10/16/22 10/16/22





  00:00 00:05 00:10


 


Temperature   


 


Heart Rate 120 H 135 H 122 H


 


Respiratory   





Rate   


 


Blood Pressure 134/84 H 121/97 H 109/94 H


 


O2 Saturation   


 


If not protocol   





: Oxygen Flow,   





liters/minute   














  10/16/22 10/16/22 10/16/22





  00:15 00:20 00:45


 


Temperature   


 


Heart Rate 75 94 111 H


 


Respiratory  27 H 28 H





Rate   


 


Blood Pressure 131/102 H 131/70 H 86/70 L


 


O2 Saturation  98 99


 


If not protocol  3 3





: Oxygen Flow,   





liters/minute   














  10/16/22





  01:00


 


Temperature 


 


Heart Rate 112 H


 


Respiratory 25 H





Rate 


 


Blood Pressure 110/88 H


 


O2 Saturation 99


 


If not protocol 3





: Oxygen Flow, 





liters/minute 








                                     Oxygen











O2 Source                      Nasal cannula


 


Oxygen Flow Rate               3

















- EKG (time done)


  ** 2131


Rate: Rate (enter#) (126)


Rhythm: Atrial fibrillation


Intervals: LBBB


Ischemia: No: ST elevation c/w ischemia





- Labs


Labs: 


                                Laboratory Tests











  10/15/22 10/15/22 10/15/22





  21:53 21:56 21:56


 


WBC   5.5 


 


RBC   3.72 L 


 


Hgb   11.5 L 


 


Hct   37.0 L 


 


MCV   99.5 H 


 


MCH   30.9 


 


MCHC   31.1 L 


 


RDW   15.3 H 


 


Plt Count   159 


 


MPV   10.1 


 


Neut # (Auto)   4.1 


 


Lymph # (Auto)   0.7 L 


 


Mono # (Auto)   0.5 


 


Eos # (Auto)   0.1 


 


Baso # (Auto)   0.0 


 


Absolute Nucleated RBC   0.00 


 


Nucleated RBC %   0.0 


 


Sodium    144


 


Potassium    3.1 L


 


Chloride    103


 


Carbon Dioxide    31


 


Anion Gap    10.0


 


BUN    28 H


 


Creatinine    1.8 H


 


Estimated GFR (MDRD)    36 L


 


Glucose    130 H


 


Lactic Acid   


 


Calcium    9.0


 


Total Bilirubin    1.8 H


 


AST    31


 


ALT    15


 


Alkaline Phosphatase    209 H


 


Troponin I High Sens   


 


B-Natriuretic Peptide   


 


Total Protein    6.6 L


 


Albumin    3.5


 


Globulin    3.1


 


Albumin/Globulin Ratio    1.1


 


Triglycerides   


 


Cholesterol   


 


LDL Cholesterol, Calc   


 


VLDL Cholesterol   


 


HDL Cholesterol   


 


LDL/HDL Ratio   


 


Cholesterol/HDL Ratio   


 


Lipase    34


 


SARS-CoV-2 (PCR)  NOT DETECTED  














  10/15/22 10/15/22 10/15/22





  21:56 21:56 21:56


 


WBC   


 


RBC   


 


Hgb   


 


Hct   


 


MCV   


 


MCH   


 


MCHC   


 


RDW   


 


Plt Count   


 


MPV   


 


Neut # (Auto)   


 


Lymph # (Auto)   


 


Mono # (Auto)   


 


Eos # (Auto)   


 


Baso # (Auto)   


 


Absolute Nucleated RBC   


 


Nucleated RBC %   


 


Sodium   


 


Potassium   


 


Chloride   


 


Carbon Dioxide   


 


Anion Gap   


 


BUN   


 


Creatinine   


 


Estimated GFR (MDRD)   


 


Glucose   


 


Lactic Acid    1.5


 


Calcium   


 


Total Bilirubin   


 


AST   


 


ALT   


 


Alkaline Phosphatase   


 


Troponin I High Sens  69.0 H*  


 


B-Natriuretic Peptide   3543 H 


 


Total Protein   


 


Albumin   


 


Globulin   


 


Albumin/Globulin Ratio   


 


Triglycerides   


 


Cholesterol   


 


LDL Cholesterol, Calc   


 


VLDL Cholesterol   


 


HDL Cholesterol   


 


LDL/HDL Ratio   


 


Cholesterol/HDL Ratio   


 


Lipase   


 


SARS-CoV-2 (PCR)   














  10/16/22 10/16/22





  01:00 01:00


 


WBC  


 


RBC  


 


Hgb  


 


Hct  


 


MCV  


 


MCH  


 


MCHC  


 


RDW  


 


Plt Count  


 


MPV  


 


Neut # (Auto)  


 


Lymph # (Auto)  


 


Mono # (Auto)  


 


Eos # (Auto)  


 


Baso # (Auto)  


 


Absolute Nucleated RBC  


 


Nucleated RBC %  


 


Sodium  


 


Potassium  


 


Chloride  


 


Carbon Dioxide  


 


Anion Gap  


 


BUN  


 


Creatinine  


 


Estimated GFR (MDRD)  


 


Glucose  


 


Lactic Acid  


 


Calcium  


 


Total Bilirubin  


 


AST  


 


ALT  


 


Alkaline Phosphatase  


 


Troponin I High Sens  84.6 H* 


 


B-Natriuretic Peptide  


 


Total Protein  


 


Albumin  


 


Globulin  


 


Albumin/Globulin Ratio  


 


Triglycerides   48


 


Cholesterol   84


 


LDL Cholesterol, Calc   44


 


VLDL Cholesterol   10


 


HDL Cholesterol   30 L


 


LDL/HDL Ratio   1.5


 


Cholesterol/HDL Ratio   2.8


 


Lipase  


 


SARS-CoV-2 (PCR)  














PD MEDICAL DECISION MAKING





- ED course


Complexity details: reviewed results, re-evaluated patient, d/w patient


ED course: 


Patient with shortness of breath and leg swelling.  He is mildly tachypneic.  

Hypoxic in the field and feels better with supplemental oxygen.Appears to be 

fluid overloaded.  Also noted to be in A. fib with RVR which she has a history 

of.  Heart rate improved with diltiazem.Patient was given IV Lasix without 

significant urine output.Discussed the case with the hospitalist who will 

evaluate the patient for admission.Patient denies chest pain to suggest 

ACS.Suspect CHF exacerbation is related to recent Decrease in frequency of Lasix

 use.








Departure





- Departure


Disposition: [facility] DC/Xfer


Clinical Impression: 


 Atrial fibrillation with RVR, Acute exacerbation of CHF (congestive heart 

failure)





Condition: Serious


Discharge Date/Time: 10/16/22 02:33

## 2022-10-16 LAB
ALBUMIN DIAFP-MCNC: 3.3 G/DL (ref 3.2–5.5)
ALBUMIN/GLOB SERPL: 1 {RATIO} (ref 1–2.2)
ALP SERPL-CCNC: 262 IU/L (ref 42–121)
ALT SERPL W P-5'-P-CCNC: 27 IU/L (ref 10–60)
ANION GAP SERPL CALCULATED.4IONS-SCNC: 9 MMOL/L (ref 6–13)
AST SERPL W P-5'-P-CCNC: 44 IU/L (ref 10–42)
BASOPHILS NFR BLD AUTO: 0 10^3/UL (ref 0–0.1)
BASOPHILS NFR BLD AUTO: 0.7 %
BILIRUB BLD-MCNC: 1.8 MG/DL (ref 0.2–1)
BUN SERPL-MCNC: 29 MG/DL (ref 6–20)
CALCIUM UR-MCNC: 8.9 MG/DL (ref 8.5–10.3)
CHLORIDE SERPL-SCNC: 103 MMOL/L (ref 101–111)
CHOLEST SERPL-MCNC: 84 MG/DL
CO2 SERPL-SCNC: 30 MMOL/L (ref 21–32)
CREAT SERPLBLD-SCNC: 1.8 MG/DL (ref 0.6–1.2)
EOSINOPHIL # BLD AUTO: 0.1 10^3/UL (ref 0–0.7)
EOSINOPHIL NFR BLD AUTO: 1.5 %
ERYTHROCYTE [DISTWIDTH] IN BLOOD BY AUTOMATED COUNT: 15.3 % (ref 12–15)
EST. AVERAGE GLUCOSE BLD GHB EST-MCNC: 134 MG/DL (ref 70–100)
GFRSERPLBLD MDRD-ARVRAT: 36 ML/MIN/{1.73_M2} (ref 89–?)
GLOBULIN SER-MCNC: 3.2 G/DL (ref 2.1–4.2)
GLUCOSE SERPL-MCNC: 126 MG/DL (ref 70–100)
HBA1C MFR BLD HPLC: 6.3 % (ref 4.27–6.07)
HCT VFR BLD AUTO: 36 % (ref 42–52)
HDLC SERPL-MCNC: 30 MG/DL
HDLC SERPL: 2.8 {RATIO} (ref ?–5)
HGB UR QL STRIP: 11.2 G/DL (ref 14–18)
LDLC SERPL CALC-MCNC: 44 MG/DL
LDLC/HDLC SERPL: 1.5 {RATIO} (ref ?–3.6)
LYMPHOCYTES # SPEC AUTO: 0.6 10^3/UL (ref 1.5–3.5)
LYMPHOCYTES NFR BLD AUTO: 11 %
MAGNESIUM SERPL-MCNC: 1.6 MG/DL (ref 1.7–2.8)
MCH RBC QN AUTO: 31 PG (ref 27–31)
MCHC RBC AUTO-ENTMCNC: 31.1 G/DL (ref 32–36)
MCV RBC AUTO: 99.7 FL (ref 80–94)
MONOCYTES # BLD AUTO: 0.5 10^3/UL (ref 0–1)
MONOCYTES NFR BLD AUTO: 9.5 %
NEUTROPHILS # BLD AUTO: 4.2 10^3/UL (ref 1.5–6.6)
NEUTROPHILS # SNV AUTO: 5.5 X10^3/UL (ref 4.8–10.8)
NEUTROPHILS NFR BLD AUTO: 77.1 %
NRBC # BLD AUTO: 0 /100WBC
NRBC # BLD AUTO: 0 X10^3/UL
PDW BLD AUTO: 11.1 FL (ref 7.4–11.4)
PHOSPHATE BLD-MCNC: 4.1 MG/DL (ref 2.5–4.6)
PLATELET # BLD: 168 10^3/UL (ref 130–450)
POTASSIUM SERPL-SCNC: 3.3 MMOL/L (ref 3.5–5)
PROT SPEC-MCNC: 6.5 G/DL (ref 6.7–8.2)
RBC MAR: 3.61 10^6/UL (ref 4.7–6.1)
SODIUM SERPLBLD-SCNC: 142 MMOL/L (ref 135–145)
TRIGL P FAST SERPL-MCNC: 48 MG/DL
VLDLC SERPL-SCNC: 10 MG/DL

## 2022-10-16 RX ADMIN — FUROSEMIDE SCH MG: 10 INJECTION, SOLUTION INTRAVENOUS at 03:42

## 2022-10-16 RX ADMIN — FUROSEMIDE SCH MG: 10 INJECTION, SOLUTION INTRAVENOUS at 13:32

## 2022-10-16 RX ADMIN — SODIUM CHLORIDE, PRESERVATIVE FREE SCH ML: 5 INJECTION INTRAVENOUS at 02:43

## 2022-10-16 RX ADMIN — APIXABAN SCH MG: 2.5 TABLET, FILM COATED ORAL at 20:26

## 2022-10-16 RX ADMIN — OXYCODONE HYDROCHLORIDE PRN MG: 100 SOLUTION ORAL at 20:26

## 2022-10-16 RX ADMIN — SODIUM CHLORIDE, PRESERVATIVE FREE SCH ML: 5 INJECTION INTRAVENOUS at 11:23

## 2022-10-16 RX ADMIN — ALBUTEROL SULFATE PRN MG: 2.5 SOLUTION RESPIRATORY (INHALATION) at 16:17

## 2022-10-16 RX ADMIN — ALBUTEROL SULFATE PRN MG: 2.5 SOLUTION RESPIRATORY (INHALATION) at 07:49

## 2022-10-16 RX ADMIN — APIXABAN SCH MG: 2.5 TABLET, FILM COATED ORAL at 08:37

## 2022-10-16 RX ADMIN — SODIUM CHLORIDE, PRESERVATIVE FREE SCH ML: 5 INJECTION INTRAVENOUS at 16:22

## 2022-10-16 RX ADMIN — PANTOPRAZOLE SODIUM SCH MG: 40 TABLET, DELAYED RELEASE ORAL at 06:07

## 2022-10-16 RX ADMIN — FUROSEMIDE SCH MG: 10 INJECTION, SOLUTION INTRAVENOUS at 06:07

## 2022-10-16 NOTE — PROVIDER PROGRESS NOTE
Hospitalist Cross-cover Note





- Cross-Cover Note


Cross-Cover Note: 





The patient was hospitalized overnight.  Unfortunately we were unable to start 

the Lasix drip because that cannot be done on [facility] and we had no ICU beds.  

He is stable this morning.  In that he is responding to the diuretic therapy.  

Heart rate is intermittently fast in the low 100s.  Blood pressure 125/93.  He 

is down to 1 L and 95% room air.  But very tachypneic on exam with crackles at 

lung bases.





Continue with diuresis and control of heart rate

## 2022-10-16 NOTE — PHARMACY PROGRESS NOTE
- Best Possible Medication History


Admit Date and Time: 10/16/22 0209


Processed by: Nursing


Medication History completed: Yes





As the person ultimately responsible for medication therapy, providers are able 

to order a medication from an existing home medication list in [facility] via the 

"Reconcile Routine" prior to Confirmation of that medication by support staff. 

Such practice is discouraged except when the physician, in their clinical 

judgment, deems that a medical need exists for a medication without regard to 

previous use.

## 2022-10-16 NOTE — HISTORY & PHYSICAL EXAMINATION
Chief Complaint





- Chief Complaint


Chief Complaint: SOB, weakness and worsening leg edema 





History of Present Illness





- Admitted From


Admitted From:: ER





- History Obtained From


Records Reviewed: yes


History obtained from: Patient 


Exam Limitations: TElecart





- History of Present Illness


HPI Comment/Other: 





This is an 86-year-old white male with a history of 2 pack/day smoking since the

age of 16, COPD on home oxygen, sleep apnea on a CPAP machine (possibly a 

trilogy machine), CAD with MI and coronary stenting in , chronic atrial fib 

on Coumadin, PVD with renal artery stent in the right done  and bilateral 

iliac stents, and has carotid blockage of 50 to 70%. He was diagnosed with 

diabetes mellitus about 5 years ago and was hospitalized here foraltered mental 

status caused by HONK with glucose greater than 1000 in 2017. He also 

has prostate disease, gout, hypertension, hyperlipidemia and status post c

holecystectomy.


Patient comes in with complaints of worsening sob and worsening leg edema.   In 

the ED he was found to be in A. fib with RVR with rates in the 120s.  He 

received diltiazem IV once and heart rate improved to 105-110.   He is COVID-

negative   His BNP is 3500 with first troponin is 69.  He is in moderate 

distress, lives with his wife served in the [employer] in his younger days, has 2 sons

and many great grandkids, he is pleasant, expressed to be DNR 


Patient is SOB while talking. He also has 3+ pedal edema and has b/l crackles as

well as diffuclty lying down flat, at this time his main issues are from CHF and

his COPD seems to be in control. Unfortunately not CPAP/Bipap available a this 

time, and patient can not be transferred to higher level of care due to 

logistics, also his creatinine is 1.8 no chest pain and awaiting further labs, 

repeat troponin. 





History





- Past Medical History


Cardiovascular: reports: Congestive heart failure, Hypertension, Coronary artery

disease, Peripheral Vascular Disease, MI, Atrial fibrillation


Respiratory: reports: COPD, Sleep apnea, CPAP use


Neuro: reports: Dementia


Endocrine/Autoimmune: reports: Type 2 diabetes


GI: reports: GERD, Ulcers, Hemorrhoids


: reports: Incontinence


HEENT: reports: Chronic hearing loss


Psych: reports: None


Musculoskeletal: reports: Gout


Derm: reports: None


MRSA Hx?: Yes





- Past Surgical History


General: reports: Cholecystectomy


Cardiovascular: reports: Coronary stent





- Family & Social History


Family History: Father:  ( at 50 of stomach cancer, dad  at 48 

of a car accident, 1 brother unknown status), Sister: Alive and Well (2 sisters 

have diabetes)


Living Situation: With spouse/s.o.





- POLST


Patient has POLST: Yes


POLST Status: DNR





Meds/Allgy





- Home Medications


Home Medications: 


                                Ambulatory Orders











 Medication  Instructions  Recorded  Confirmed


 


Furosemide 40 mg PO DAILY 01/25/17 10/15/22


 


allopurinoL [Allopurinol] 300 mg PO DAILY 01/25/17 10/15/22


 


Apixaban [Eliquis] 2.5 mg PO BID 05/10/22 10/15/22


 


Metoprolol Succinate [Toprol Xl] 25 mg PO BID #60 tablet 05/12/22 10/15/22


 


Simvastatin [Zocor] 40 mg PO HS #30 tablet 05/12/22 10/15/22


 


cefUROXime axetiL [Ceftin] 500 mg PO BID #6 tablet 22 


 


lisinopriL [Zestril] 20 mg PO DAILY #30 tablet 05/12/22 10/15/22


 


Albuterol 2.5 mg INH Q4H PRN 10/15/22 10/15/22














- Allergies


Allergies/Adverse Reactions: 


                                    Allergies











Allergy/AdvReac Type Severity Reaction Status Date / Time


 


ibuprofen Allergy  Hives Verified 10/15/22 21:37














Review of Systems





- Cardiovascular


Cariovascular: reports: Irregular heart rate, Palpitations, Edema, Orthopnea





- Respiratory


Respiratory: reports: Orthopnea, SOB at rest, SOB with exertion





- Neurological


Neurological: reports: General weakness





- Endocrine


Endocrine: reports: Polyuria


Prior Level of Functionality: 


Live with wife and states compliant with meds, recently lasix changed to QOD,. 

states can easily take care of his ADL








Exam





- Vital Signs


Vital Signs: 





                                Vital Signs x48h











  Temp Pulse Resp BP Pulse Ox O2 Flow Rate


 


 10/16/22 00:20   94  27 H  131/70 H  98  3


 


 10/16/22 00:15   75   131/102 H  


 


 10/16/22 00:10   122 H   109/94 H  


 


 10/16/22 00:05   135 H   121/97 H  


 


 10/16/22 00:00   120 H   134/84 H  


 


 10/15/22 23:05   114 H  22  122/89 H  99  3


 


 10/15/22 21:51   123 H  22  126/81 H  99  3


 


 10/15/22 21:33  36.8 C  76  29 H  111/83 H  93 














- Physical Exam


General Appearance: positive: Mild distress


Eyes Bilateral: positive: Normal inspection


Neck: positive: Nml inspection


Respiratory: positive: Rales, Rhonchi


Cardiovascular: positive: Irregularly irregular, Systolic murmur


Abdomen: positive: Non-tender


Extremities: positive: Pedal edema


Neurologic/Psychiatric: positive: Oriented x3, CN's nml (2-12)





Sepsis Event Note (H)





- Evaluation


Current Stage of Sepsis: Ruled out





Conclusion/Plan





- Problem List


(1) CHF (congestive heart failure)


Conclusion/Plan: 


Daily wieght


Stict in and out


Coreg


Lisinopril


Echo


lasix drip


Monitor electrolytes bid


Zaroxoly x one 


Heart failure education


Recommend stress test 











(2) CKD (chronic kidney disease)


Conclusion/Plan: 


Avoid nephrotoxin


Last creatine few month ago 1.7


Continue to closely monitor 


some compent of cardiorenal 








(3) Leg edema


Conclusion/Plan: 


kelton stocking and diuresis 


on long term Eliquis


last admission had doppler and DVt ruled out











(4) Troponin level elevated


Conclusion/Plan: 


Trend troponin 


Likely Type II MI 


demand ischemia from A fib with rvr and chf in setting of cKD


Continue to closely monitor 








(5) Atrial fibrillation with RVR


Conclusion/Plan: 


Rate control 


Doigoxin


Coreg


In and out


continue Eliquis 








(6) BPH (benign prostatic hyperplasia)


Conclusion/Plan: 


Place [facility] for strict In and out


While in ER tried to stand up to urinate and got unsteady and severe SOB and 

fatigue 








(7) CAD (coronary artery disease)


Conclusion/Plan: 


S/p PCI in  


Details not known 


Trend troponin and continue Eliquis


outpatient stress test - Pharmacological 








(8) Diabetes


Conclusion/Plan: 


Check A1c and sliding scale with coverage 


Qualifiers: 


   Diabetes mellitus type: type 2 





(9) Hx of coronary artery disease


Conclusion/Plan: 


Trend troponin and as above if any chest pain please call us right away 








(10) Hypertension


Conclusion/Plan: 


on lisinopril


Coreg 








(11) Tobacco abuse


Conclusion/Plan: 


Counseling for cessationand resources for cessation 











- Lab Results


Fish Bones: 


                                 10/15/22 21:56





                                 10/15/22 21:56





- Diagnostic Imaging Results


Diagnostic Imaging Results: positive: Prelim report reviewed





- EKG Results


EKG Interpreted Independently: Yes


EKG Comparison: Old EKG unavailable

## 2022-10-17 LAB
ANION GAP SERPL CALCULATED.4IONS-SCNC: 12 MMOL/L (ref 6–13)
BUN SERPL-MCNC: 34 MG/DL (ref 6–20)
CALCIUM UR-MCNC: 9.1 MG/DL (ref 8.5–10.3)
CHLORIDE SERPL-SCNC: 97 MMOL/L (ref 101–111)
CO2 SERPL-SCNC: 30 MMOL/L (ref 21–32)
CREAT SERPLBLD-SCNC: 2 MG/DL (ref 0.6–1.2)
ERYTHROCYTE [DISTWIDTH] IN BLOOD BY AUTOMATED COUNT: 15.1 % (ref 12–15)
GFRSERPLBLD MDRD-ARVRAT: 32 ML/MIN/{1.73_M2} (ref 89–?)
GLUCOSE SERPL-MCNC: 128 MG/DL (ref 70–100)
HCT VFR BLD AUTO: 36.1 % (ref 42–52)
HGB UR QL STRIP: 11.5 G/DL (ref 14–18)
MAGNESIUM SERPL-MCNC: 1.6 MG/DL (ref 1.7–2.8)
MCH RBC QN AUTO: 31.5 PG (ref 27–31)
MCHC RBC AUTO-ENTMCNC: 31.9 G/DL (ref 32–36)
MCV RBC AUTO: 98.9 FL (ref 80–94)
NEUTROPHILS # SNV AUTO: 8.2 X10^3/UL (ref 4.8–10.8)
PDW BLD AUTO: 11.3 FL (ref 7.4–11.4)
PLATELET # BLD: 163 10^3/UL (ref 130–450)
POTASSIUM SERPL-SCNC: 3 MMOL/L (ref 3.5–5)
RBC MAR: 3.65 10^6/UL (ref 4.7–6.1)
SODIUM SERPLBLD-SCNC: 139 MMOL/L (ref 135–145)

## 2022-10-17 RX ADMIN — POTASSIUM CHLORIDE SCH MLS/HR: 7.46 INJECTION, SOLUTION INTRAVENOUS at 10:35

## 2022-10-17 RX ADMIN — SODIUM CHLORIDE, PRESERVATIVE FREE SCH ML: 5 INJECTION INTRAVENOUS at 16:34

## 2022-10-17 RX ADMIN — APIXABAN SCH MG: 2.5 TABLET, FILM COATED ORAL at 09:04

## 2022-10-17 RX ADMIN — OXYCODONE HYDROCHLORIDE PRN MG: 100 SOLUTION ORAL at 12:28

## 2022-10-17 RX ADMIN — POTASSIUM CHLORIDE SCH MLS/HR: 7.46 INJECTION, SOLUTION INTRAVENOUS at 09:25

## 2022-10-17 RX ADMIN — POTASSIUM CHLORIDE SCH MLS/HR: 7.46 INJECTION, SOLUTION INTRAVENOUS at 15:03

## 2022-10-17 RX ADMIN — PANTOPRAZOLE SODIUM SCH MG: 40 TABLET, DELAYED RELEASE ORAL at 06:57

## 2022-10-17 RX ADMIN — FUROSEMIDE SCH MG: 10 INJECTION, SOLUTION INTRAVENOUS at 06:57

## 2022-10-17 RX ADMIN — POTASSIUM CHLORIDE SCH MLS/HR: 7.46 INJECTION, SOLUTION INTRAVENOUS at 13:36

## 2022-10-17 RX ADMIN — SODIUM CHLORIDE, PRESERVATIVE FREE SCH ML: 5 INJECTION INTRAVENOUS at 01:31

## 2022-10-17 RX ADMIN — APIXABAN SCH MG: 2.5 TABLET, FILM COATED ORAL at 21:03

## 2022-10-17 RX ADMIN — POTASSIUM CHLORIDE SCH MLS/HR: 7.46 INJECTION, SOLUTION INTRAVENOUS at 16:34

## 2022-10-17 RX ADMIN — Medication SCH MG: at 14:33

## 2022-10-17 RX ADMIN — SODIUM CHLORIDE, PRESERVATIVE FREE SCH ML: 5 INJECTION INTRAVENOUS at 09:04

## 2022-10-17 RX ADMIN — ALBUTEROL SULFATE PRN MG: 2.5 SOLUTION RESPIRATORY (INHALATION) at 04:56

## 2022-10-17 RX ADMIN — POTASSIUM CHLORIDE SCH MLS/HR: 7.46 INJECTION, SOLUTION INTRAVENOUS at 12:00

## 2022-10-17 NOTE — PROVIDER PROGRESS NOTE
Subjective





- Prog Note Date


Prog Note Date: 10/17/22


Prog Note Time: 14:05





- Subjective


Subjective: 





Less short of breath.  More mobile within the room.  But still short of breath 

with minimal activity.  Brisk diuretic response to his Lasix.  Weight is down.





Current Medications





- Current Medications


Current Medications: 





Active Medications





Acetaminophen (Acetaminophen 325 Mg Tablet)  650 mg PO Q4HR PRN


   PRN Reason: Pain 1 to 4, or Fever


Hydrocodone Bitart/Acetaminophen (Hydrocod/Acetam 5/325 Mg Tablet)  1 tab PO 

Q4HR PRN


   PRN Reason: Pain 5 to 7


Albuterol (Albuterol Neb 2.5 Mg/3 Ml)  2.5 mg INH Q4H PRN


   PRN Reason: Wheezing


   Last Admin: 10/17/22 04:56 Dose:  2.5 mg


   


Allopurinol (Allopurinol 100 Mg Tablet)  100 mg PO DAILY [facility]


   Last Admin: 10/17/22 09:04 Dose:  100 mg


   


Apixaban (Apixaban 2.5 Mg Tablet)  2.5 mg PO BID [facility]


   Last Admin: 10/17/22 09:04 Dose:  2.5 mg


   


Carvedilol (Carvedilol 3.125 Mg Tablet)  3.125 mg PO BID [facility]


   Last Admin: 10/17/22 09:06 Dose:  3.125 mg


   


Furosemide (Furosemide 40 Mg Tablet)  40 mg PO DAILY [facility]


Potassium Chloride (Potassium Chloride)  10 meq in 100 mls @ 100 mls/hr IV Q1H 

[facility]


   Stop: 10/17/22 14:59


   Last Admin: 10/17/22 13:36 Dose:  75 mls/hr


   


Lisinopril (Lisinopril 5 Mg Tablet)  20 mg PO HS [facility]


   Last Admin: 10/16/22 20:26 Dose:  20 mg


   


Magnesium Oxide (Magnesium Oxide 400 Mg Tablet)  400 mg PO DAILYWM [facility]


Morphine Sulfate (Morphine 2 Mg/Ml Carpuject)  2 mg IVP Q2HR PRN


   PRN Reason: Pain 8 to 10


Ondansetron HCl (Ondansetron 4 Mg/2 Ml Vial)  4 mg IVP Q6HR PRN


   PRN Reason: Nausea / Vomiting


Oxycodone HCl (Oxycodone 10 Mg/0.5 Ml Syringe)  5 mg PO Q4HR PRN


   PRN Reason: Shortness of Air/Wheezing


   Last Admin: 10/17/22 12:28 Dose:  5 mg


   


Pantoprazole Sodium (Pantoprazole 40 Mg Tablet)  40 mg PO QDAC [facility]


   Last Admin: 10/17/22 06:57 Dose:  40 mg


   


Sodium Chloride (Sodium Chloride Flush 0.9% 10 Ml Syringe)  10 ml IVP 

0100,0900,1700 [facility]


   Last Admin: 10/17/22 09:04 Dose:  10 ml


   


Sodium Chloride (Sodium Chloride Flush 0.9% 10 Ml Syringe)  10 ml IVP PRN PRN


   PRN Reason: AS NEEDED PER PROVIDER ORDERS


   Last Admin: 10/17/22 09:21 Dose:  10 ml


   


Temazepam (Temazepam 15 Mg Capsule)  15 mg PO QPM PRN


   PRN Reason: Insomnia





                                        





Furosemide 40 mg PO DAILY 01/25/17 


allopurinoL [Allopurinol] 300 mg PO DAILY 01/25/17 


Apixaban [Eliquis] 2.5 mg PO BID 05/10/22 


Albuterol 2.5 mg INH Q4H PRN 10/15/22 


Rosuvastatin Calcium [Crestor] 20 mg PO 10/17/22 


Tiotropium Bromide [Spiriva] 1 puffs INH DAILY 10/17/22 


diltiaZEM CD [Cardizem Cd] 120 mg PO ONCE 10/17/22 











Objective





- Vital Signs/Intake & Output


Reviewed Vital Signs: Yes


Vital Signs: 


                                Vital Signs x48h











  Temp Pulse Resp BP Pulse Ox O2 Flow Rate


 


 10/17/22 13:24      95  1


 


 10/17/22 13:16       1


 


 10/17/22 13:15      89 L  0.5


 


 10/17/22 12:40      92  0.5


 


 10/17/22 12:39      88 L 


 


 10/17/22 12:32      97  1


 


 10/17/22 11:14  36.6 C  105 H  23  108/76  93  1


 


 10/17/22 07:59      94  1


 


 10/17/22 07:35       1.5


 


 10/17/22 07:25  36.6 C  109 H  22  115/80  92  1











Intake & Output: 


                                 Intake & Output











 10/14/22 10/15/22 10/16/22 10/17/22





 23:59 23:59 23:59 23:59


 


Intake Total   700 1283.5


 


Output Total   4325 4025


 


Balance   -9514 -0701.5














- Objective


General Appearance: positive: Alert, Other (When he is in his room by himself, 

he groans quite a bit.  It is audible down the hallway.  Yet when we walk in the

room the groaning stops, and he says that it is more just of a response of the 

effort it takes to get up and do things not pain)


Eyes Bilateral: positive: PERRL, EOMI


ENT: positive: Pharynx nml


Neck: positive: No JVD.  negative: Stiff neck


Respiratory: positive: Other (But he pants with exertion at times.  Diminished 

breath sounds at the bases.  No wheezing.).  negative: Rales


Cardiovascular: positive: Irregularly irregular, Systolic murmur


Abdomen: positive: Non-tender, No organomegaly, Nml bowel sounds, No distention


Skin: positive: Warm, Dry


Extremities: positive: Pedal edema


Neurologic/Psychiatric: positive: Oriented x3 (But is very forgetful 

gentleman.), CN's nml (2-12) (Moderately deaf), Motor nml (Slow shuffling gait)





- Lab Results


Fish Bones: 


                                 10/17/22 05:51





                                 10/17/22 05:51


Other Labs: 


                               Lab Results x24hrs











  10/17/22 10/17/22 10/17/22 Range/Units





  11:11 07:24 05:51 


 


WBC     (4.8-10.8)  x10^3/uL


 


RBC     (4.70-6.10)  10^6/uL


 


Hgb     (14.0-18.0)  g/dL


 


Hct     (42.0-52.0)  %


 


MCV     (80.0-94.0)  fL


 


MCH     (27.0-31.0)  pg


 


MCHC     (32.0-36.0)  g/dL


 


RDW     (12.0-15.0)  %


 


Plt Count     (130-450)  10^3/uL


 


MPV     (7.4-11.4)  fL


 


Sodium    139  (135-145)  mmol/L


 


Potassium    3.0 L  (3.5-5.0)  mmol/L


 


Chloride    97 L  (101-111)  mmol/L


 


Carbon Dioxide    30  (21-32)  mmol/L


 


Anion Gap    12.0  (6-13)  


 


BUN    34 H  (6-20)  mg/dL


 


Creatinine    2.0 H  (0.6-1.2)  mg/dL


 


Estimated GFR (MDRD)    32 L  (>89)  


 


Glucose    128 H  ()  mg/dL


 


POC Whole Bld Glucose  122 H  117 H   (70 - 100)  mg/dL


 


Calcium    9.1  (8.5-10.3)  mg/dL


 


Magnesium    1.6 L  (1.7-2.8)  mg/dL














  10/17/22 10/16/22 10/16/22 Range/Units





  05:51 20:36 16:45 


 


WBC  8.2    (4.8-10.8)  x10^3/uL


 


RBC  3.65 L    (4.70-6.10)  10^6/uL


 


Hgb  11.5 L    (14.0-18.0)  g/dL


 


Hct  36.1 L    (42.0-52.0)  %


 


MCV  98.9 H    (80.0-94.0)  fL


 


MCH  31.5 H    (27.0-31.0)  pg


 


MCHC  31.9 L    (32.0-36.0)  g/dL


 


RDW  15.1 H    (12.0-15.0)  %


 


Plt Count  163    (130-450)  10^3/uL


 


MPV  11.3    (7.4-11.4)  fL


 


Sodium     (135-145)  mmol/L


 


Potassium     (3.5-5.0)  mmol/L


 


Chloride     (101-111)  mmol/L


 


Carbon Dioxide     (21-32)  mmol/L


 


Anion Gap     (6-13)  


 


BUN     (6-20)  mg/dL


 


Creatinine     (0.6-1.2)  mg/dL


 


Estimated GFR (MDRD)     (>89)  


 


Glucose     ()  mg/dL


 


POC Whole Bld Glucose   140 H  127 H  (70 - 100)  mg/dL


 


Calcium     (8.5-10.3)  mg/dL


 


Magnesium     (1.7-2.8)  mg/dL














Sepsis Event Note (H)





- Evaluation


Current Stage of Sepsis: Ruled out





Assessment/Plan





- Problem List


(1) Acute exacerbation of CHF (congestive heart failure)


Impression: 


Yesterday his balance was negative by 3225 cc.  As of today he is negative by 

2741 cc.  His weight on admission was 99.7 kg.  By the end of the day he was 

91.5 kg.  Today he is 88.5 kg.  But he is still short of breath with minimum 

exertion.  Getting up out of bed stress inducing coughing and moaning.  By the 

time he gets to the bathroom to sit on the toilet he is run out of gas.  He says

he is better.  Cough is his main problem.  He was using 3 L yesterday, down to 1

L and he is 95%.  At times with exertion he can go down to 89%.





I expect that he may be at baseline status by tomorrow.  Of note, today is his 

birthday. He has developed hypokalemia with the diuresis.





Continue:


Daily wieght, Change activity to as tolerated as opposed to bed rest


Stict in and out


Coreg


Lisinopril


Echo Has been ordered.  Will be done tomorrow with the next scheduled technician

being here


lasix drip Was not used.  He is on Lasix IV push twice daily and doing 

well.Creatinine is starting to come up.  Weight is down substantially.  We will 

change Lasix to p.o.


Supplement potassium


Heart failure education


Recommend stress test In the outpatient setting.  This is if he feels that it is

something he wants to pursue at the age of 87.





(2) Atrial fibrillation with RVR


Conclusion/Plan: 


He continue to be with high rates when he was first admitted 122 and as high as 

135.  When he is at rest his heart rate will drop to the 50s.  When he gets up 

to go to the bathroom his heart rate will get as high as 116.  He is on Coreg.  

Also on Eliquis at 2.5.





He received 1 dose of digoxin on admission but that has not been continued.  

Since his heart rate is responding and is down in the 50s at times I will hold 

off on any further rate lowering drug





(3) CKD (chronic kidney disease)


Conclusion/Plan: 


His creatinine was 1.7 before admission.  He was 1.8 for the last 2 days.  With 

diuresis his creatinine is come up to 2.





As such I think we are reaching the limits of how much more I can diurese him.  

We will change Lasix to p.o.





(4) Leg edema


Conclusion/Plan: 


kelton stocking and diuresis 


on long term Eliquis


last admission had doppler and DVt ruled out





(5) Troponin level elevated


Conclusion/Plan: 


Troponin #1 was 69, #2 was 84, and #3 was 87.


Likely Type II MI 


demand ischemia from A fib with rvr and chf in setting of cKD


Continue to closely monitor 





(6) BPH (benign prostatic hyperplasia)


Conclusion/Plan: 


Place [facility] for strict In and out


While in ER tried to stand up to urinate and got unsteady and severe SOB and 

fatigue . He still has shortness of breath and fatigue with getting up but not 

nearly as severe as it was yesterday.  Yesterday he would be gasping for 

air.Today is better.








(7) CAD (coronary artery disease)


Conclusion/Plan: 


S/p PCI in 2012 


Details not known 





outpatient stress test - Pharmacological If he wants to pursue further treatment

of his coronary artery disease from a more aggressive perspective








(8) Diabetes


Conclusion/Plan: 


A1c is 6.3%.  He has not needed any supplemental coverage while here.  We will 

stop point-of-care glucose testing.We will add Ensure because of poor p.o. 

intake.


Qualifiers: 


   Diabetes mellitus type: type 2 





(9) Hx of coronary artery disease


Conclusion/Plan: 


Troponin's were flat.  No change in management





(10) Hypertension


Conclusion/Plan: 


on lisinopril


Coreg 





:

## 2022-10-18 LAB
ANION GAP SERPL CALCULATED.4IONS-SCNC: 12 MMOL/L (ref 6–13)
BASOPHILS NFR BLD AUTO: 0 10^3/UL (ref 0–0.1)
BASOPHILS NFR BLD AUTO: 0.3 %
BUN SERPL-MCNC: 42 MG/DL (ref 6–20)
CALCIUM UR-MCNC: 9.4 MG/DL (ref 8.5–10.3)
CHLORIDE SERPL-SCNC: 95 MMOL/L (ref 101–111)
CO2 SERPL-SCNC: 32 MMOL/L (ref 21–32)
CREAT SERPLBLD-SCNC: 2 MG/DL (ref 0.6–1.2)
EOSINOPHIL # BLD AUTO: 0.1 10^3/UL (ref 0–0.7)
EOSINOPHIL NFR BLD AUTO: 1.3 %
ERYTHROCYTE [DISTWIDTH] IN BLOOD BY AUTOMATED COUNT: 14.8 % (ref 12–15)
GFRSERPLBLD MDRD-ARVRAT: 32 ML/MIN/{1.73_M2} (ref 89–?)
GLUCOSE SERPL-MCNC: 137 MG/DL (ref 70–100)
HCT VFR BLD AUTO: 36.7 % (ref 42–52)
HGB UR QL STRIP: 11.5 G/DL (ref 14–18)
LYMPHOCYTES # SPEC AUTO: 0.8 10^3/UL (ref 1.5–3.5)
LYMPHOCYTES NFR BLD AUTO: 9.2 %
MCH RBC QN AUTO: 30.9 PG (ref 27–31)
MCHC RBC AUTO-ENTMCNC: 31.3 G/DL (ref 32–36)
MCV RBC AUTO: 98.7 FL (ref 80–94)
MONOCYTES # BLD AUTO: 1 10^3/UL (ref 0–1)
MONOCYTES NFR BLD AUTO: 11.7 %
NEUTROPHILS # BLD AUTO: 6.7 10^3/UL (ref 1.5–6.6)
NEUTROPHILS # SNV AUTO: 8.7 X10^3/UL (ref 4.8–10.8)
NEUTROPHILS NFR BLD AUTO: 77.3 %
NRBC # BLD AUTO: 0 /100WBC
NRBC # BLD AUTO: 0 X10^3/UL
PDW BLD AUTO: 11.4 FL (ref 7.4–11.4)
PLATELET # BLD: 184 10^3/UL (ref 130–450)
POTASSIUM SERPL-SCNC: 3.5 MMOL/L (ref 3.5–5)
RBC MAR: 3.72 10^6/UL (ref 4.7–6.1)
SODIUM SERPLBLD-SCNC: 139 MMOL/L (ref 135–145)

## 2022-10-18 RX ADMIN — APIXABAN SCH MG: 2.5 TABLET, FILM COATED ORAL at 20:41

## 2022-10-18 RX ADMIN — METOPROLOL SUCCINATE SCH MG: 25 TABLET, EXTENDED RELEASE ORAL at 10:02

## 2022-10-18 RX ADMIN — SODIUM CHLORIDE, PRESERVATIVE FREE SCH ML: 5 INJECTION INTRAVENOUS at 20:41

## 2022-10-18 RX ADMIN — FUROSEMIDE SCH MG: 40 TABLET ORAL at 10:01

## 2022-10-18 RX ADMIN — PANTOPRAZOLE SODIUM SCH MG: 40 TABLET, DELAYED RELEASE ORAL at 06:54

## 2022-10-18 RX ADMIN — APIXABAN SCH MG: 2.5 TABLET, FILM COATED ORAL at 10:02

## 2022-10-18 RX ADMIN — IPRATROPIUM BROMIDE SCH: 0.5 SOLUTION RESPIRATORY (INHALATION) at 15:54

## 2022-10-18 RX ADMIN — SODIUM CHLORIDE, PRESERVATIVE FREE SCH: 5 INJECTION INTRAVENOUS at 02:04

## 2022-10-18 RX ADMIN — Medication SCH MG: at 10:03

## 2022-10-18 RX ADMIN — SODIUM CHLORIDE, PRESERVATIVE FREE SCH ML: 5 INJECTION INTRAVENOUS at 10:43

## 2022-10-18 RX ADMIN — METOPROLOL SUCCINATE SCH MG: 25 TABLET, EXTENDED RELEASE ORAL at 20:41

## 2022-10-18 NOTE — PROVIDER PROGRESS NOTE
Assessment/Plan





- Problem List


(1) Acute exacerbation of CHF (congestive heart failure)


Assessment/Plan: 


Etiology is unclear, the Echocardiogram has not yet been done.


We will order diuretics and he has had >5 L neg fluid balance. But he is still 

short of breath with minimum exertion.  Getting up out of bed caused coughing 

and moaning, which he reports are both somewhat better.


Continue with diuresis, Lasix is now once daily orally.


Continue to follow daily weight, I's and O's


Victoria can be DCd today


Echo ordered and is pending (we only have Echo service Tues-Thurs, today is 

Tues)


Continue beta-blockers and adjust medications for better rate control.


Continue with his ACE inhibitor and depending on EF will start Spironolactone


HGe will likely need a stress test in the outpatient setting.  This is if he 

feels that it is something he wants to pursue at the age of 87.





(2) Atrial fibrillation with RVR


Conclusion/Plan: 


He continues to have high heart rates intermittently, most;ly with standing and 

walking.


Continue B-blocker and will increase the dose, therefore will decrease 

Lisinopril dose somewhat.


Continue on Eliquis at 2.5 bid for stroke prevention





(3) CKD (chronic kidney disease)


Conclusion/Plan: 


His creatinine was 1.7 before admission and was 1.8 for the last 2 days.  With 

diuresis his creatinine has increased up to 2. Because of this his IV Lasix was 

changed to p.o.


Avoid nephrotoxins.


Follow BMP daily





(4) Leg edema


Conclusion/Plan: 


He still has 4+ pitting edema to his thighs. He states he "cannot elevate his 

legs", unclear why, since he walks.


Continue kelton stocking and diuresis 


Plan long term Eliquis, and at the last admission he had doppler and DVt ruled 

out





(5) Troponin level elevated


Conclusion/Plan: 


Troponin #1 was 69, #2 was 84, and #3 was 87.


Likely Type II MI, 


demand ischemia from A fib with rvr and chf in setting of cKD


Continue to closely monitor and treat CHF and HR





(6) BPH (benign prostatic hyperplasia)


Conclusion/Plan: 


Will remove the Victoria today, it was needed to closely follow I's and O's.


He may need to stand up to urinate





(7) Hx of CAD (coronary artery disease)


Conclusion/Plan: 


S/p PCI in 2012 


Details not known 


Troponin's were flat.  No change in management planned as an Inpt (since we do 

not have staff to do a stress test)


Plan a pharmaceutical outpatient stress test, if he wants to pursue further sanjana

tment of his coronary artery disease from a more aggressive perspective





(8) Diabetes


Conclusion/Plan: 


A1c is 6.3%, indicating Borderline DM.  He has not needed any supplemental 

coverage while here.  We will stop point-of-care glucose testing. 


We added Ensure because of poor p.o. intake.


Qualifiers: 


   Diabetes mellitus type: type 2 





(9) Hypertension


Conclusion/Plan: 


Cont B-clocker and lisinopril











- Current Meds


Current Meds: 





                               Current Medications











Generic Name Dose Route Start Last Admin





  Trade Name Freq  PRN Reason Stop Dose Admin


 


Albuterol  2.5 mg  10/16/22 00:45  10/17/22 04:56





  Albuterol Neb 2.5 Mg/3 Ml  INH   2.5 mg





  Q4H PRN   Administration





  Wheezing  


 


Allopurinol  100 mg  10/16/22 09:00  10/18/22 10:02





  Allopurinol 100 Mg Tablet  PO   100 mg





  DAILY DIVYA   Administration


 


Apixaban  2.5 mg  10/16/22 09:00  10/18/22 10:02





  Apixaban 2.5 Mg Tablet  PO   2.5 mg





  BID DIVYA   Administration


 


Furosemide  40 mg  10/18/22 09:00  10/18/22 10:01





  Furosemide 40 Mg Tablet  PO   40 mg





  DAILY DIVYA   Administration


 


Magnesium Oxide  400 mg  10/17/22 14:00  10/18/22 10:03





  Magnesium Oxide 400 Mg Tablet  PO   400 mg





  DAILYWM DIVYA   Administration


 


Metoprolol Succinate  25 mg  10/18/22 09:00  10/18/22 10:02





  Metoprolol Succinate 25 Mg Tablet  PO   25 mg





  BID DIVYA   Administration


 


Oxycodone HCl  5 mg  10/16/22 16:03  10/17/22 12:28





  Oxycodone 10 Mg/0.5 Ml Syringe  PO   5 mg





  Q4HR PRN   Administration





  Shortness of Air/Wheezing  


 


Pantoprazole Sodium  40 mg  10/16/22 07:00  10/18/22 06:54





  Pantoprazole 40 Mg Tablet  PO   40 mg





  QDAC DIVYA   Administration


 


Sodium Chloride  10 ml  10/16/22 01:00  10/18/22 10:43





  Sodium Chloride Flush 0.9% 10 Ml Syringe  IVP   10 ml





  0100,0900,1700 DIVYA   Administration


 


Sodium Chloride  10 ml  10/16/22 00:26  10/17/22 09:21





  Sodium Chloride Flush 0.9% 10 Ml Syringe  IVP   10 ml





  PRN PRN   Administration





  AS NEEDED PER PROVIDER ORDERS  














- Lab Result


Fish Bone Diagrams: 


                                 10/18/22 04:36





                                 10/18/22 04:36





- Additional Planning


My Orders: 





My Active Orders





10/18/22 09:00


Metoprolol Succinate [Toprol Xl]   25 mg PO BID 





10/18/22 10:00


Ipratropium [Atrovent]   0.5 mg INH RTQID 





10/18/22 21:00


lisinopriL [Zestril]   10 mg PO HS 





10/19/22 05:00


MAGNESIUM [CHEM] DAILYLAB 














Subjective





- Subjective


Patient Reports: Cough, Shortness of Breath (Very SOB withactivity)


Nursing Reports: Other (RN reports he is very orthopneic, could not lie flat in 

bed to have Victoria removed, needed to stand)





Objective


Vital Signs: 





                               Vital Signs - 24 hr











  10/17/22 10/17/22 10/17/22





  12:32 12:39 12:40


 


Temperature   


 


Heart Rate [   





Radial]   


 


Respiratory   





Rate   


 


Blood Pressure   





[Left Brachial   





artery]   


 


Blood Pressure   





[Right Brachial   





artery]   


 


O2 Saturation 97 88 L 92


 


If not protocol 1  0.5





: Oxygen Flow,   





liters/minute   














  10/17/22 10/17/22 10/17/22





  13:15 13:16 13:24


 


Temperature   


 


Heart Rate [   





Radial]   


 


Respiratory   





Rate   


 


Blood Pressure   





[Left Brachial   





artery]   


 


Blood Pressure   





[Right Brachial   





artery]   


 


O2 Saturation 89 L  95


 


If not protocol 0.5 1 1





: Oxygen Flow,   





liters/minute   














  10/17/22 10/17/22 10/17/22





  16:18 20:46 23:09


 


Temperature 36.7 C 37.0 C 36.7 C


 


Heart Rate [ 88 111 H 56 L





Radial]   


 


Respiratory 18 18 18





Rate   


 


Blood Pressure   





[Left Brachial   





artery]   


 


Blood Pressure 118/88 H 112/76 120/73





[Right Brachial   





artery]   


 


O2 Saturation 96 95 90 L


 


If not protocol 1 1 1





: Oxygen Flow,   





liters/minute   














  10/18/22 10/18/22 10/18/22





  05:15 07:25 07:28


 


Temperature 36.6 C  36.7 C


 


Heart Rate [ 126 H  108 H





Radial]   


 


Respiratory 20  22





Rate   


 


Blood Pressure   





[Left Brachial   





artery]   


 


Blood Pressure 112/73  103/56 L





[Right Brachial   





artery]   


 


O2 Saturation 91 L  92


 


If not protocol 1 1 1





: Oxygen Flow,   





liters/minute   














  10/18/22 10/18/22 10/18/22





  09:25 09:30 10:01


 


Temperature   


 


Heart Rate [ 119 H 115 H 109 H





Radial]   


 


Respiratory 22  





Rate   


 


Blood Pressure 97/66 100/80 105/71





[Left Brachial   





artery]   


 


Blood Pressure   





[Right Brachial   





artery]   


 


O2 Saturation   


 


If not protocol   





: Oxygen Flow,   





liters/minute   














  10/18/22 10/18/22





  10:04 11:15


 


Temperature  36.6 C


 


Heart Rate [ 103 H 114 H





Radial]  


 


Respiratory  20





Rate  


 


Blood Pressure 113/74 109/88 H





[Left Brachial  





artery]  


 


Blood Pressure  





[Right Brachial  





artery]  


 


O2 Saturation  97


 


If not protocol  1





: Oxygen Flow,  





liters/minute  








                                     Oxygen











O2 Source                      Nasal cannula


 


Oxygen Flow Rate               3














I&O (Last 24 Hrs): 





                          Intake and Output Totals x24h











 10/16/22 10/17/22 10/18/22





 23:59 23:59 23:59


 


Intake Total 700 2283.5 480


 


Output Total 4325 5075 1050


 


Balance -0505 -2791.5 -570











General: Alert, Oriented x3, Other (Thin, frail appearing, sitting in chair 

leaning forward (as if tripoding), wearing O2 per n.c.)


HEENT: Mucous membr. moist/pink


Neck: Supple, No JVD (in a vertical position)


Neuro: Alert, Non Focal


Cardiovascular: No murmurs, Other (Distant heart sounds, tachycardic and irreg)


Respiratory: Rales, Rhonchi


Abdomen: Soft


Extremities: Other (4+ pitting edema to upper thighs)





- Results


Results: 





                               Laboratory Results











WBC  8.7 x10^3/uL (4.8-10.8)   10/18/22  04:36    


 


RBC  3.72 10^6/uL (4.70-6.10)  L  10/18/22  04:36    


 


Hgb  11.5 g/dL (14.0-18.0)  L  10/18/22  04:36    


 


Hct  36.7 % (42.0-52.0)  L  10/18/22  04:36    


 


MCV  98.7 fL (80.0-94.0)  H  10/18/22  04:36    


 


MCH  30.9 pg (27.0-31.0)   10/18/22  04:36    


 


MCHC  31.3 g/dL (32.0-36.0)  L  10/18/22  04:36    


 


RDW  14.8 % (12.0-15.0)   10/18/22  04:36    


 


Plt Count  184 10^3/uL (130-450)   10/18/22  04:36    


 


MPV  11.4 fL (7.4-11.4)   10/18/22  04:36    


 


Neut # (Auto)  6.7 10^3/uL (1.5-6.6)  H  10/18/22  04:36    


 


Lymph # (Auto)  0.8 10^3/uL (1.5-3.5)  L  10/18/22  04:36    


 


Mono # (Auto)  1.0 10^3/uL (0.0-1.0)   10/18/22  04:36    


 


Eos # (Auto)  0.1 10^3/uL (0.0-0.7)   10/18/22  04:36    


 


Baso # (Auto)  0.0 10^3/uL (0.0-0.1)   10/18/22  04:36    


 


Absolute Nucleated RBC  0.00 x10^3/uL  10/18/22  04:36    


 


Nucleated RBC %  0.0 /100WBC  10/18/22  04:36    


 


Sodium  139 mmol/L (135-145)   10/18/22  04:36    


 


Potassium  3.5 mmol/L (3.5-5.0)   10/18/22  04:36    


 


Chloride  95 mmol/L (101-111)  L  10/18/22  04:36    


 


Carbon Dioxide  32 mmol/L (21-32)   10/18/22  04:36    


 


Anion Gap  12.0  (6-13)   10/18/22  04:36    


 


BUN  42 mg/dL (6-20)  H  10/18/22  04:36    


 


Creatinine  2.0 mg/dL (0.6-1.2)  H  10/18/22  04:36    


 


Estimated GFR (MDRD)  32  (>89)  L  10/18/22  04:36    


 


Glucose  137 mg/dL ()  H  10/18/22  04:36    


 


POC Whole Bld Glucose  182 mg/dL (70 - 100)  H  10/18/22  11:15    


 


Estimat Average Glucose  134 mg/dL ()  H  10/16/22  05:17    


 


Hemoglobin A1c %  6.3 % (4.27-6.07)  H  10/16/22  05:17    


 


Lactic Acid  1.5 mmol/L (0.5-2.2)   10/15/22  21:56    


 


Calcium  9.4 mg/dL (8.5-10.3)   10/18/22  04:36    


 


Phosphorus  4.1 mg/dL (2.5-4.6)   10/16/22  05:17    


 


Magnesium  1.6 mg/dL (1.7-2.8)  L  10/18/22  04:36    


 


Total Bilirubin  1.8 mg/dL (0.2-1.0)  H  10/16/22  05:17    


 


AST  44 IU/L (10-42)  H  10/16/22  05:17    


 


ALT  27 IU/L (10-60)   10/16/22  05:17    


 


Alkaline Phosphatase  262 IU/L ()  H  10/16/22  05:17    


 


Troponin I High Sens  87.8 ng/L (2.3-19.7)  H*  10/16/22  05:17    


 


B-Natriuretic Peptide  3543 pg/mL (5-100)  H  10/15/22  21:56    


 


Total Protein  6.5 g/dL (6.7-8.2)  L  10/16/22  05:17    


 


Albumin  3.3 g/dL (3.2-5.5)   10/16/22  05:17    


 


Globulin  3.2 g/dL (2.1-4.2)   10/16/22  05:17    


 


Albumin/Globulin Ratio  1.0  (1.0-2.2)   10/16/22  05:17    


 


Triglycerides  48 mg/dL (-149)  10/16/22  01:00    


 


Cholesterol  84 mg/dL (-199)  10/16/22  01:00    


 


LDL Cholesterol, Calc  44 mg/dL (-129)  10/16/22  01:00    


 


VLDL Cholesterol  10 mg/dL  10/16/22  01:00    


 


HDL Cholesterol  30 mg/dL (60-) L  10/16/22  01:00    


 


LDL/HDL Ratio  1.5  (<3.6)   10/16/22  01:00    


 


Cholesterol/HDL Ratio  2.8  (<5.0)   10/16/22  01:00    


 


Lipase  34 U/L (22-51)   10/15/22  21:56    


 


TSH  < 0.08 uIU/mL (0.34-5.60)  L  10/16/22  05:17    


 


SARS-CoV-2 (PCR)  NOT DETECTED   10/15/22  21:53    














- Procedures


Procedures: 





Procedures





INTRODUCTION OF ANTI-INFLAM INTO PERIPH VEIN, PERC APPROACH (05/09/22)











Sepsis Event Note (H)





- Evaluation


Current Stage of Sepsis: Ruled out

## 2022-10-19 LAB
ANION GAP SERPL CALCULATED.4IONS-SCNC: 8 MMOL/L (ref 6–13)
BASOPHILS NFR BLD AUTO: 0 10^3/UL (ref 0–0.1)
BASOPHILS NFR BLD AUTO: 0.4 %
BUN SERPL-MCNC: 52 MG/DL (ref 6–20)
CALCIUM UR-MCNC: 9.2 MG/DL (ref 8.5–10.3)
CHLORIDE SERPL-SCNC: 97 MMOL/L (ref 101–111)
CO2 SERPL-SCNC: 32 MMOL/L (ref 21–32)
CREAT SERPLBLD-SCNC: 2.2 MG/DL (ref 0.6–1.2)
EOSINOPHIL # BLD AUTO: 0.1 10^3/UL (ref 0–0.7)
EOSINOPHIL NFR BLD AUTO: 0.9 %
ERYTHROCYTE [DISTWIDTH] IN BLOOD BY AUTOMATED COUNT: 14.7 % (ref 12–15)
GFRSERPLBLD MDRD-ARVRAT: 29 ML/MIN/{1.73_M2} (ref 89–?)
GLUCOSE SERPL-MCNC: 141 MG/DL (ref 70–100)
HCT VFR BLD AUTO: 35.7 % (ref 42–52)
HGB UR QL STRIP: 11.2 G/DL (ref 14–18)
LYMPHOCYTES # SPEC AUTO: 0.7 10^3/UL (ref 1.5–3.5)
LYMPHOCYTES NFR BLD AUTO: 13.1 %
MAGNESIUM SERPL-MCNC: 1.7 MG/DL (ref 1.7–2.8)
MCH RBC QN AUTO: 30.8 PG (ref 27–31)
MCHC RBC AUTO-ENTMCNC: 31.4 G/DL (ref 32–36)
MCV RBC AUTO: 98.1 FL (ref 80–94)
MONOCYTES # BLD AUTO: 0.8 10^3/UL (ref 0–1)
MONOCYTES NFR BLD AUTO: 14.8 %
NEUTROPHILS # BLD AUTO: 3.8 10^3/UL (ref 1.5–6.6)
NEUTROPHILS # SNV AUTO: 5.4 X10^3/UL (ref 4.8–10.8)
NEUTROPHILS NFR BLD AUTO: 70.6 %
NRBC # BLD AUTO: 0 /100WBC
NRBC # BLD AUTO: 0 X10^3/UL
PDW BLD AUTO: 11.5 FL (ref 7.4–11.4)
PLATELET # BLD: 162 10^3/UL (ref 130–450)
POTASSIUM SERPL-SCNC: 2.9 MMOL/L (ref 3.5–5)
RBC MAR: 3.64 10^6/UL (ref 4.7–6.1)
SODIUM SERPLBLD-SCNC: 137 MMOL/L (ref 135–145)

## 2022-10-19 RX ADMIN — METOPROLOL SUCCINATE SCH MG: 25 TABLET, EXTENDED RELEASE ORAL at 08:10

## 2022-10-19 RX ADMIN — SODIUM CHLORIDE, PRESERVATIVE FREE SCH ML: 5 INJECTION INTRAVENOUS at 09:15

## 2022-10-19 RX ADMIN — ALBUTEROL SULFATE PRN MG: 2.5 SOLUTION RESPIRATORY (INHALATION) at 08:30

## 2022-10-19 RX ADMIN — APIXABAN SCH MG: 2.5 TABLET, FILM COATED ORAL at 20:45

## 2022-10-19 RX ADMIN — IPRATROPIUM BROMIDE AND ALBUTEROL SULFATE SCH ML: 2.5; .5 SOLUTION RESPIRATORY (INHALATION) at 18:15

## 2022-10-19 RX ADMIN — SODIUM CHLORIDE, PRESERVATIVE FREE SCH: 5 INJECTION INTRAVENOUS at 01:00

## 2022-10-19 RX ADMIN — Medication SCH MG: at 08:10

## 2022-10-19 RX ADMIN — IPRATROPIUM BROMIDE AND ALBUTEROL SULFATE SCH ML: 2.5; .5 SOLUTION RESPIRATORY (INHALATION) at 15:28

## 2022-10-19 RX ADMIN — APIXABAN SCH MG: 2.5 TABLET, FILM COATED ORAL at 08:11

## 2022-10-19 RX ADMIN — METOPROLOL SUCCINATE SCH MG: 25 TABLET, EXTENDED RELEASE ORAL at 20:46

## 2022-10-19 RX ADMIN — IPRATROPIUM BROMIDE SCH MG: 0.5 SOLUTION RESPIRATORY (INHALATION) at 08:31

## 2022-10-19 RX ADMIN — IPRATROPIUM BROMIDE SCH: 0.5 SOLUTION RESPIRATORY (INHALATION) at 08:19

## 2022-10-19 RX ADMIN — NICOTINE SCH PATCH: 21 PATCH TRANSDERMAL at 13:13

## 2022-10-19 RX ADMIN — PANTOPRAZOLE SODIUM SCH MG: 40 TABLET, DELAYED RELEASE ORAL at 07:02

## 2022-10-19 RX ADMIN — SODIUM CHLORIDE, PRESERVATIVE FREE SCH: 5 INJECTION INTRAVENOUS at 18:14

## 2022-10-19 RX ADMIN — IPRATROPIUM BROMIDE SCH: 0.5 SOLUTION RESPIRATORY (INHALATION) at 08:20

## 2022-10-19 RX ADMIN — FUROSEMIDE SCH MG: 40 TABLET ORAL at 08:10

## 2022-10-19 NOTE — CT REPORT
PROCEDURE:  CT brain without contrast

 

INDICATIONS:  Confusion, poor short term memory

 

TECHNIQUE:  

Noncontrast 4.5 mm thick angled axial sections acquired from the foramen magnum to the vertex.  For r
adiation dose reduction, the following was used:  automated exposure control, adjustment of mA and/or
 kV according to patient size.

 

COMPARISON:  1/25/2017

 

FINDINGS:  

Image quality:  Excellent.  

 

CSF spaces:  Basal cisterns are patent.  No extra-axial fluid collections.  Ventricles are normal in 
size and shape.  

 

Brain:  No midline shift.  No intracranial masses or hemorrhage.  Gray-white matter interface is norm
al.  Moderate atrophy and multifocal white matter chronic ischemic change noted. Atherosclerotic vasc
ular calcification noted in the cavernous segments of both internal carotid arteries as well as the i
ntradural vertebral arteries.

 

Skull and face:  Calvarium and visualized facial bones are intact, without suspicious lesions.  

 

Sinuses:  Visualized sinuses and mastoids are clear.  

 

IMPRESSION:  

 

Atrophy and chronic ischemic change without acute hemorrhage or mass effect

 

Reviewed by: Jules Clemens MD on 10/19/2022 12:27 PM VERONICA

Approved by: Jules Clemens MD on 10/19/2022 12:27 PM AKDT

 

 

Station ID:  [id number]

## 2022-10-19 NOTE — PROVIDER PROGRESS NOTE
Assessment/Plan





- Problem List


(1) Acute exacerbation of CHF (congestive heart failure)


Qualifiers: 


   Heart failure type: systolic   Qualified Code(s): I50.23 - Acute on chronic 

systolic (congestive) heart failure   


Assessment/Plan: 


Echocardiogram was done and shows LVEF of <20% and 4-chamber dilitation. He has 

never had an Echo done here before to have a comparison, but based on his 

medications, this is new systolic heart failure.


Continue with diuresis, Lasix is now once daily orally.


Continue to follow daily weight, I's and O's and daily BMP. Watching his 

elevated BUN/creat ratio, signifying that we are drying out his intravascular 

space, but he still has 4+ leg edema


Continue beta-blockers and will need to increase the dose further today, for 

better rate control.


Continue with his ACE inhibitor, decreasing the dose, as we increase the B-b

locker for rate control, also watching the rising creat. If needed, he could be 

on Hydralazine + Nitrates.


Will start Spironolactone tomorrow


Because he has pulled off his telemetry and pulled out his IV 4 times since 

admission, will discontinue telemetry and IV orders, as he is on po meds and we 

can follow HR during VS checks.


He will likely need a stress test in the outpatient setting, since we have no 

stress testing service available here whatsoever currently.  This is, if he 

feels that it is something he wants to pursue at the age of 87.





(2) Atrial fibrillation with RVR


Conclusion/Plan: 


He continues to have high heart rates >100 only intermittently, now mostly with 

standing and walking.


Continue B-blocker and will increase the dose once again, therefore will further

decrease Lisinopril dose somewhat.


He was on Cardizem at home which has not been started here and we will try not 

to resume it, if possible, because of the (new) systolic heart failure


Continue on Eliquis at 2.5 bid for his anticoagulant for stroke prevention





(3) COPD with exacerbation


In review of the chart and after talking to the wife by phone today, it appears 

he does have known COPD, is currently not on home oxygen, but may have had it in

the remote past. He is on inhalers but continues to smoke. His exam is 

consistent with COPD exacerbation.


Will change his as needed albuterol to DuoNeb scheduled and as needed DuoNeb.


Will continue with supplemental oxygen keeping saturations over 88%.





(4) Leg edema


Conclusion/Plan: 


He still has 4+ pitting edema to his thighs. He states he "cannot elevate his 

legs", unclear why, since he walks. 


The Echo result is back and it shows that he has an RV dilated, cor pulmonale 

and mild pulm hypertension with PA pressure 54 mmHg


Continue kelton stocking and gentle diuresis 


At the last admission in 5/22, he was on Eliquis and he had leg doppler due to 

swelling, and DVt was ruled out. Will not repeat a leg Doppler.





(5) Hypokalemia


Related to IV diuresis.


Will replace potassium gently, given the worsening creatinine.


Follow BMP daily





(6) CKD (chronic kidney disease)


Conclusion/Plan: 


His creatinine was 1.7 before admission and has increased with IV Lasix. His 

Lasix is now po for more gentle diuresis. 


Avoid nephrotoxins.


Follow BMP daily





(7) Acute delirium


He has pulled off his telemetry and pulled out his IV 4 times since admission, 

needs to be reminded to call for assistance standing up. 


I reviewed the chart and this type of confusion while hospitalized was also seen

at the last admission. The wife had told the last admission's Hospitalist that 

the patient has mild dementia


I spoke to his wife today by phone, and she confirmed that he has poor short-

term memory, excellent long-term memory.


The wife had a suspicion that maybe he is fidgety because of nicotine 

withdrawal, since she reports that he still smokes 3 packs/day.


Will order nicotine patch.


Will obtain a head CT to evaluate for stroke or atrophy.





(8) BPH (benign prostatic hyperplasia)


Conclusion/Plan: 


We have restarted his meds


He may need to stand up to urinate





(9) Tobacco abuse


I spoke to his wife today by phone, and she confirmed that he continues to smoke

3 PPD. We both thought that possibly his fidgetiness is from nicotine withdrawal

and urges.


Will order nicotine patch. The wife requests that nicotine patches be ordered at

the time of discharge as well which will be done





(10) Hx of CAD (coronary artery disease)


Conclusion/Plan: 


S/p PCI in 2012, Details not known 


Troponin's were flat.  No change in management planned (since we do not have 

staff to do a stress test)


He would need a pharmaceutical outpatient stress test, if he wants to pursue 

further treatment of his coronary artery disease from a more aggressive 

perspective





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


Conclusion/Plan: 


A1c is 6.3%, indicating Borderline DM.  He has not needed any supplemental 

coverage while here.  We will stop point-of-care glucose testing. 


We added Ensure because of poor p.o. intake.


Qualifiers: 


   Diabetes mellitus type: type 2 





(12) Hx Hypertension


Conclusion/Plan: 


Continuing B-clocker and lisinopril, now also on Lasix and starting 

Spironolactone.





(13) Troponin level elevated


Conclusion/Plan: 


Likely Type II MI, demand ischemia from A fib with rvr and chf in setting of cKD


Continue to closely monitor and treat CHF and HR








- Current Meds


Current Meds: 





                               Current Medications











Generic Name Dose Route Start Last Admin





  Trade Name Freq  PRN Reason Stop Dose Admin


 


Allopurinol  100 mg  10/16/22 09:00  10/19/22 08:11





  Allopurinol 100 Mg Tablet  PO   100 mg





  DAILY DIVYA   Administration


 


Apixaban  2.5 mg  10/16/22 09:00  10/19/22 08:11





  Apixaban 2.5 Mg Tablet  PO   2.5 mg





  BID DIVYA   Administration


 


Furosemide  40 mg  10/18/22 09:00  10/19/22 08:10





  Furosemide 40 Mg Tablet  PO   40 mg





  DAILY DIVYA   Administration


 


Ipratropium Bromide  0.5 mg  10/18/22 10:00  10/19/22 08:31





  Ipratropium 0.2 Mg/Ml Neb  INH   0.5 mg





  RTQID DIVYA   Administration


 


Lisinopril  10 mg  10/18/22 21:00  10/18/22 20:37





  Lisinopril 5 Mg Tablet  PO   Not Given





  HS DIVYA  


 


Magnesium Oxide  400 mg  10/17/22 14:00  10/19/22 08:10





  Magnesium Oxide 400 Mg Tablet  PO   400 mg





  DAILYWM DIVYA   Administration


 


Metoprolol Succinate  25 mg  10/18/22 09:00  10/19/22 08:10





  Metoprolol Succinate 25 Mg Tablet  PO   25 mg





  BID DIVYA   Administration


 


Oxycodone HCl  5 mg  10/16/22 16:03  10/17/22 12:28





  Oxycodone 10 Mg/0.5 Ml Syringe  PO   5 mg





  Q4HR PRN   Administration





  Shortness of Air/Wheezing  


 


Pantoprazole Sodium  40 mg  10/16/22 07:00  10/19/22 07:02





  Pantoprazole 40 Mg Tablet  PO   40 mg





  QDAC DIVYA   Administration


 


Sodium Chloride  10 ml  10/16/22 01:00  10/19/22 01:00





  Sodium Chloride Flush 0.9% 10 Ml Syringe  IVP   Not Given





  0100,0900,1700 DIVYA  


 


Sodium Chloride  10 ml  10/16/22 00:26  10/17/22 09:21





  Sodium Chloride Flush 0.9% 10 Ml Syringe  IVP   10 ml





  PRN PRN   Administration





  AS NEEDED PER PROVIDER ORDERS  














- Lab Result


Fish Bone Diagrams: 


                                 10/20/22 04:33





                                 10/20/22 04:33





- Additional Planning


My Orders: 





My Active Orders





10/18/22 09:00


Metoprolol Succinate [Toprol Xl]   25 mg PO BID 





10/18/22 10:00


Ipratropium [Atrovent]   0.5 mg INH RTQID 





10/18/22 21:00


lisinopriL [Zestril]   10 mg PO HS 





10/18/22 21:11


Zinc Oxide 20% Oint [Zinc Oxide]   1 applic TOP PRN PRN 





10/19/22 07:29


Miscellaenous Nursing Order [RC] QSHIFT 





10/19/22 09:02


Nebulizer/MDI Tx. [RC] QID 


Resp Teach Nebulizer/MDI [RC] .ONCE 


Ipratropium/Albuterol [Duoneb]   3 ml INH Q4HR PRN 





10/19/22 10:00


Potassium Chloride [Micro-K]   20 meq PO ONCE ONE 


Potassium Chloride/Water 10 mEq/100 mL q1h (Enter # of bags) Potassium Chlor 10 

Meq/100 ml [Potassium Chloride] 10 meq in 100 ml IV Q1H 














Subjective





- Subjective


Patient Reports: Feeling Better


Nursing Reports: Other (Very forgetful, tries to get up on his own despite 

multiple warnings for multiple days to call for help when he wants to go to the 

bathroom)





Objective


Vital Signs: 





                               Vital Signs - 24 hr











  10/18/22 10/18/22 10/18/22





  09:25 09:30 10:01


 


Temperature   


 


Heart Rate   


 


Heart Rate [   





Activity]   


 


Heart Rate [   





Brachial]   


 


Heart Rate [ 119 H 115 H 109 H





Radial]   


 


Respiratory 22  





Rate   


 


Blood Pressure   





[Activity]   


 


Blood Pressure 97/66 100/80 105/71





[Left Brachial   





artery]   


 


O2 Saturation   


 


If not protocol   





: Oxygen Flow,   





liters/minute   














  10/18/22 10/18/22 10/18/22





  10:04 11:15 11:25


 


Temperature  36.6 C 


 


Heart Rate   


 


Heart Rate [   100





Activity]   


 


Heart Rate [   





Brachial]   


 


Heart Rate [ 103 H 114 H 





Radial]   


 


Respiratory  20 





Rate   


 


Blood Pressure   95/49 L





[Activity]   


 


Blood Pressure 113/74 109/88 H 





[Left Brachial   





artery]   


 


O2 Saturation  97 


 


If not protocol  1 





: Oxygen Flow,   





liters/minute   














  10/18/22 10/18/22 10/18/22





  11:30 16:08 16:20


 


Temperature  36.5 C 


 


Heart Rate   


 


Heart Rate [ 100  





Activity]   


 


Heart Rate [   





Brachial]   


 


Heart Rate [  130 H 





Radial]   


 


Respiratory  22 





Rate   


 


Blood Pressure 95/49 L  





[Activity]   


 


Blood Pressure  114/38 L 





[Left Brachial   





artery]   


 


O2 Saturation  90 L 95


 


If not protocol  1 1





: Oxygen Flow,   





liters/minute   














  10/18/22 10/18/22 10/19/22





  19:00 20:32 00:02


 


Temperature  37.1 C 36.6 C


 


Heart Rate   


 


Heart Rate [   





Activity]   


 


Heart Rate [  111 H 89





Brachial]   


 


Heart Rate [   





Radial]   


 


Respiratory  20 18





Rate   


 


Blood Pressure   





[Activity]   


 


Blood Pressure  106/76 108/66





[Left Brachial   





artery]   


 


O2 Saturation  93 96


 


If not protocol 1.5 1 1





: Oxygen Flow,   





liters/minute   














  10/19/22 10/19/22 10/19/22





  04:56 07:16 08:30


 


Temperature 36.9 C 36.4 C L 


 


Heart Rate   102 H


 


Heart Rate [   





Activity]   


 


Heart Rate [ 86 97 





Brachial]   


 


Heart Rate [   





Radial]   


 


Respiratory 18 20 20





Rate   


 


Blood Pressure   





[Activity]   


 


Blood Pressure 129/79 102/65 





[Left Brachial   





artery]   


 


O2 Saturation 99 93 


 


If not protocol 1 2 2





: Oxygen Flow,   





liters/minute   








                                     Oxygen











O2 Source                      Nasal cannula


 


Oxygen Flow Rate               3














I&O (Last 24 Hrs): 





                          Intake and Output Totals x24h











 10/17/22 10/18/22 10/19/22





 23:59 23:59 23:59


 


Intake Total 2283.5 1340 712


 


Output Total 5075 1170 101


 


Balance -2791.5 170 611











General: Alert, Oriented x3


HEENT: Mucous membr. moist/pink, Other (wearing O2 n.c.)


Neck: Supple, No JVD (in vertical position)


Neuro: Alert, Other ((has foot drop, per PT))


Cardiovascular: No murmurs, Other (irreg irreg)


Respiratory: Rhonchi (Poor air mvm)


Abdomen: Soft, No tenderness


Extremities: Other (4+ edema to mid thighs)





- Results


Results: 





                               Laboratory Results











WBC  5.4 x10^3/uL (4.8-10.8)   10/19/22  05:33    


 


RBC  3.64 10^6/uL (4.70-6.10)  L  10/19/22  05:33    


 


Hgb  11.2 g/dL (14.0-18.0)  L  10/19/22  05:33    


 


Hct  35.7 % (42.0-52.0)  L  10/19/22  05:33    


 


MCV  98.1 fL (80.0-94.0)  H  10/19/22  05:33    


 


MCH  30.8 pg (27.0-31.0)   10/19/22  05:33    


 


MCHC  31.4 g/dL (32.0-36.0)  L  10/19/22  05:33    


 


RDW  14.7 % (12.0-15.0)   10/19/22  05:33    


 


Plt Count  162 10^3/uL (130-450)   10/19/22  05:33    


 


MPV  11.5 fL (7.4-11.4)  H  10/19/22  05:33    


 


Neut # (Auto)  3.8 10^3/uL (1.5-6.6)   10/19/22  05:33    


 


Lymph # (Auto)  0.7 10^3/uL (1.5-3.5)  L  10/19/22  05:33    


 


Mono # (Auto)  0.8 10^3/uL (0.0-1.0)   10/19/22  05:33    


 


Eos # (Auto)  0.1 10^3/uL (0.0-0.7)   10/19/22  05:33    


 


Baso # (Auto)  0.0 10^3/uL (0.0-0.1)   10/19/22  05:33    


 


Absolute Nucleated RBC  0.00 x10^3/uL  10/19/22  05:33    


 


Nucleated RBC %  0.0 /100WBC  10/19/22  05:33    


 


Sodium  137 mmol/L (135-145)   10/19/22  05:33    


 


Potassium  2.9 mmol/L (3.5-5.0)  L  10/19/22  05:33    


 


Chloride  97 mmol/L (101-111)  L  10/19/22  05:33    


 


Carbon Dioxide  32 mmol/L (21-32)   10/19/22  05:33    


 


Anion Gap  8.0  (6-13)   10/19/22  05:33    


 


BUN  52 mg/dL (6-20)  H  10/19/22  05:33    


 


Creatinine  2.2 mg/dL (0.6-1.2)  H  10/19/22  05:33    


 


Estimated GFR (MDRD)  29  (>89)  L  10/19/22  05:33    


 


Glucose  141 mg/dL ()  H  10/19/22  05:33    


 


POC Whole Bld Glucose  182 mg/dL (70 - 100)  H  10/18/22  11:15    


 


Estimat Average Glucose  134 mg/dL ()  H  10/16/22  05:17    


 


Hemoglobin A1c %  6.3 % (4.27-6.07)  H  10/16/22  05:17    


 


Lactic Acid  1.5 mmol/L (0.5-2.2)   10/15/22  21:56    


 


Calcium  9.2 mg/dL (8.5-10.3)   10/19/22  05:33    


 


Phosphorus  4.1 mg/dL (2.5-4.6)   10/16/22  05:17    


 


Magnesium  1.7 mg/dL (1.7-2.8)   10/19/22  05:33    


 


Total Bilirubin  1.8 mg/dL (0.2-1.0)  H  10/16/22  05:17    


 


AST  44 IU/L (10-42)  H  10/16/22  05:17    


 


ALT  27 IU/L (10-60)   10/16/22  05:17    


 


Alkaline Phosphatase  262 IU/L ()  H  10/16/22  05:17    


 


Troponin I High Sens  87.8 ng/L (2.3-19.7)  H*  10/16/22  05:17    


 


B-Natriuretic Peptide  3543 pg/mL (5-100)  H  10/15/22  21:56    


 


Total Protein  6.5 g/dL (6.7-8.2)  L  10/16/22  05:17    


 


Albumin  3.3 g/dL (3.2-5.5)   10/16/22  05:17    


 


Globulin  3.2 g/dL (2.1-4.2)   10/16/22  05:17    


 


Albumin/Globulin Ratio  1.0  (1.0-2.2)   10/16/22  05:17    


 


Triglycerides  48 mg/dL (-149)  10/16/22  01:00    


 


Cholesterol  84 mg/dL (-199)  10/16/22  01:00    


 


LDL Cholesterol, Calc  44 mg/dL (-129)  10/16/22  01:00    


 


VLDL Cholesterol  10 mg/dL  10/16/22  01:00    


 


HDL Cholesterol  30 mg/dL (60-) L  10/16/22  01:00    


 


LDL/HDL Ratio  1.5  (<3.6)   10/16/22  01:00    


 


Cholesterol/HDL Ratio  2.8  (<5.0)   10/16/22  01:00    


 


Lipase  34 U/L (22-51)   10/15/22  21:56    


 


TSH  < 0.08 uIU/mL (0.34-5.60)  L  10/16/22  05:17    


 


SARS-CoV-2 (PCR)  NOT DETECTED   10/15/22  21:53    














- Procedures


Procedures: 





Procedures





INTRODUCTION OF ANTI-INFLAM INTO PERIPH VEIN, PERC APPROACH (05/09/22)











Sepsis Event Note (H)





- Evaluation


Current Stage of Sepsis: Ruled out

## 2022-10-20 LAB
ANION GAP SERPL CALCULATED.4IONS-SCNC: 13 MMOL/L (ref 6–13)
BASOPHILS NFR BLD AUTO: 0 10^3/UL (ref 0–0.1)
BASOPHILS NFR BLD AUTO: 0.6 %
BUN SERPL-MCNC: 61 MG/DL (ref 6–20)
CALCIUM UR-MCNC: 9.3 MG/DL (ref 8.5–10.3)
CHLORIDE SERPL-SCNC: 92 MMOL/L (ref 101–111)
CO2 SERPL-SCNC: 33 MMOL/L (ref 21–32)
CREAT SERPLBLD-SCNC: 2.2 MG/DL (ref 0.6–1.2)
EOSINOPHIL # BLD AUTO: 0.2 10^3/UL (ref 0–0.7)
EOSINOPHIL NFR BLD AUTO: 3.1 %
ERYTHROCYTE [DISTWIDTH] IN BLOOD BY AUTOMATED COUNT: 14.6 % (ref 12–15)
GFRSERPLBLD MDRD-ARVRAT: 29 ML/MIN/{1.73_M2} (ref 89–?)
GLUCOSE SERPL-MCNC: 148 MG/DL (ref 70–100)
HCT VFR BLD AUTO: 35.9 % (ref 42–52)
HGB UR QL STRIP: 11.1 G/DL (ref 14–18)
LYMPHOCYTES # SPEC AUTO: 1.1 10^3/UL (ref 1.5–3.5)
LYMPHOCYTES NFR BLD AUTO: 20.3 %
MCH RBC QN AUTO: 30.5 PG (ref 27–31)
MCHC RBC AUTO-ENTMCNC: 30.9 G/DL (ref 32–36)
MCV RBC AUTO: 98.6 FL (ref 80–94)
MONOCYTES # BLD AUTO: 0.7 10^3/UL (ref 0–1)
MONOCYTES NFR BLD AUTO: 13.4 %
NEUTROPHILS # BLD AUTO: 3.3 10^3/UL (ref 1.5–6.6)
NEUTROPHILS # SNV AUTO: 5.2 X10^3/UL (ref 4.8–10.8)
NEUTROPHILS NFR BLD AUTO: 62.2 %
NRBC # BLD AUTO: 0 /100WBC
NRBC # BLD AUTO: 0 X10^3/UL
PDW BLD AUTO: 11.3 FL (ref 7.4–11.4)
PLATELET # BLD: 177 10^3/UL (ref 130–450)
POTASSIUM SERPL-SCNC: 3 MMOL/L (ref 3.5–5)
RBC MAR: 3.64 10^6/UL (ref 4.7–6.1)
SODIUM SERPLBLD-SCNC: 138 MMOL/L (ref 135–145)

## 2022-10-20 RX ADMIN — SPIRONOLACTONE SCH MG: 25 TABLET, FILM COATED ORAL at 08:37

## 2022-10-20 RX ADMIN — PANTOPRAZOLE SODIUM SCH MG: 40 TABLET, DELAYED RELEASE ORAL at 06:30

## 2022-10-20 RX ADMIN — IPRATROPIUM BROMIDE AND ALBUTEROL SULFATE SCH ML: 2.5; .5 SOLUTION RESPIRATORY (INHALATION) at 10:55

## 2022-10-20 RX ADMIN — APIXABAN SCH MG: 2.5 TABLET, FILM COATED ORAL at 08:36

## 2022-10-20 RX ADMIN — SODIUM CHLORIDE, PRESERVATIVE FREE SCH: 5 INJECTION INTRAVENOUS at 08:37

## 2022-10-20 RX ADMIN — NICOTINE SCH PATCH: 21 PATCH TRANSDERMAL at 08:37

## 2022-10-20 RX ADMIN — METOPROLOL SUCCINATE SCH MG: 25 TABLET, EXTENDED RELEASE ORAL at 20:57

## 2022-10-20 RX ADMIN — SODIUM CHLORIDE, PRESERVATIVE FREE SCH: 5 INJECTION INTRAVENOUS at 01:39

## 2022-10-20 RX ADMIN — POTASSIUM CHLORIDE SCH MEQ: 750 CAPSULE, EXTENDED RELEASE ORAL at 08:37

## 2022-10-20 RX ADMIN — APIXABAN SCH MG: 2.5 TABLET, FILM COATED ORAL at 20:58

## 2022-10-20 RX ADMIN — IPRATROPIUM BROMIDE AND ALBUTEROL SULFATE SCH ML: 2.5; .5 SOLUTION RESPIRATORY (INHALATION) at 15:11

## 2022-10-20 RX ADMIN — IPRATROPIUM BROMIDE AND ALBUTEROL SULFATE SCH ML: 2.5; .5 SOLUTION RESPIRATORY (INHALATION) at 21:07

## 2022-10-20 RX ADMIN — METOPROLOL SUCCINATE SCH MG: 25 TABLET, EXTENDED RELEASE ORAL at 08:36

## 2022-10-20 RX ADMIN — POTASSIUM CHLORIDE SCH MEQ: 750 CAPSULE, EXTENDED RELEASE ORAL at 11:50

## 2022-10-20 RX ADMIN — IPRATROPIUM BROMIDE AND ALBUTEROL SULFATE SCH ML: 2.5; .5 SOLUTION RESPIRATORY (INHALATION) at 06:51

## 2022-10-20 RX ADMIN — Medication SCH MG: at 08:35

## 2022-10-20 RX ADMIN — SODIUM CHLORIDE, PRESERVATIVE FREE SCH: 5 INJECTION INTRAVENOUS at 16:39

## 2022-10-20 NOTE — PROVIDER PROGRESS NOTE
Assessment/Plan





- Problem List


(1) Acute exacerbation of CHF (congestive heart failure)


Qualifiers: 


   Heart failure type: systolic   Qualified Code(s): I50.23 - Acute on chronic 

systolic (congestive) heart failure   


Assessment/Plan: 


Echocardiogram was done and shows LVEF of <20% and 4-chamber dilatation. He has 

never had an Echo done here before to have a comparison, but based on his 

medications, this is new systolic heart failure.


Continue with diuresis, Lasix was now once daily orally.


Continue to follow daily weight, I's and O's and daily BMP. Watching his 

elevated BUN/creat ratio, signifying that we are drying out his intravascular 

space, but he still has 4+ leg edema


Continue beta-blockers and will need to increase the dose further today, for 

better rate control.


Continue with his ACE inhibitor, decreasing the dose, as we increase the B-

blocker for rate control, also watching the rising creat. If needed, he could be

on Hydralazine + Nitrates.


Will start Spironolactone tomorrow


Because he has pulled off his telemetry and pulled out his IV 4 times since 

admission, I discontinued telemetry and IV orders, as he is on po meds and we 

can follow HR during VS checks.


He will likely need a stress test in the outpatient setting, since we have no 

stress testing service available here whatsoever currently.  This is, if he 

feels that it is something he wants to pursue at the age of 87.





(2) Atrial fibrillation with RVR


Conclusion/Plan: 


He continues to have high heart rates >100 only intermittently, now mostly with 

standing and walking.


Continue B-blocker and will increase the dose once again, therefore will further

decrease Lisinopril dose somewhat.


He was on Cardizem at home which has not been started here and we will try not 

to resume it, if possible, because of the (new) systolic heart failure


Continue on Eliquis at 2.5 bid for his anticoagulant for stroke prevention





(3) COPD with exacerbation


In review of the chart and after talking to the wife by phone yesterday, and he 

does have known COPD, is currently not on home oxygen, but may have had it in 

the remote past. He is on inhalers but continues to smoke. His exam is 

consistent with COPD with exacerbation.


Will change his as needed albuterol to DuoNeb scheduled and additional prn 

DuoNeb.


Will continue with supplemental oxygen keeping saturations over 88%.


He will likely need home O2 and will plan for him to have an oximetry walk test 

on the day of discharge (hopefully tomorrow).





(4) Leg edema


Conclusion/Plan: 


He still has 4+ pitting edema to his thighs. He states he "cannot elevate his 

legs", unclear why, since he walks. 


The Echo result is back and it shows that he has an RV dilated, cor pulmonale 

and mild pulm hypertension with PA pressure 54 mmHg


Continue vanesa stocking and gentle diuresis 


At the last admission in 5/22, he was on Eliquis and he had leg doppler due to 

swelling, and DVt was ruled out. Will not repeat a leg Doppler.





(5) Hypokalemia


Related to IV diuresis.


Will replace potassium gently, given the worsening creatinine.


Follow BMP daily





(6) CKD (chronic kidney disease)


Conclusion/Plan: 


His creatinine was 1.7 before admission and has increased with IV Lasix. His 

Lasix is now po for more gentle diuresis. 


Avoid nephrotoxins.


Follow BMP daily





(7) Acute delirium


He has pulled off his telemetry and pulled out his IV 4 times since admission, n

eeds to be reminded to call for assistance standing up. 


I reviewed the chart and this type of confusion while hospitalized was also seen

at the last admission. The wife had told the last admission's Hospitalist that 

the patient has mild dementia


I spoke to his wife yesterday by phone, and she confirmed that he has poor 

short-term memory, excellent long-term memory.


The wife said she had a suspicion that maybe he is fidgety because of nicotine 

withdrawal, since she reports that he still smokes 3 packs/day.


We ordered nicotine patch.


A head CT was done and did showed atrophy.





(8) BPH (benign prostatic hyperplasia)


Conclusion/Plan: 


We have restarted his meds


He may need to stand up to urinate





(9) Tobacco abuse


I spoke to his wife yesterday by phone, and she confirmed that he continues to 

smoke 3 PPD. We both thought that possibly his fidgetiness is from nicotine 

withdrawal and urges.


We ordered nicotine patch. The wife requested that nicotine patches be ordered 

at the time of discharge as well, which will be done





(10) Hx of CAD (coronary artery disease)


Conclusion/Plan: 


S/p PCI in 2012, details not known 


Troponin's were flat.  No change in management planned (since we do not have 

staff to do a stress test)


He would need a pharmaceutical outpatient stress test, if he wants to pursue 

further treatment of his coronary artery disease from a more aggressive 

perspective





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


Conclusion/Plan: 


A1c is 6.3%, indicating Borderline DM.  He has not needed any supplemental 

coverage while here.  We will stop point-of-care glucose testing. 


We added Ensure because of poor p.o. intake.


Qualifiers: 


   Diabetes mellitus type: type 2 





(12) Hx Hypertension


Conclusion/Plan: 


Continuing B-clocker and lisinopril, now also on Lasix and starting 

Spironolactone.





(13) Troponin level elevated


Conclusion/Plan: 


Likely Type II MI, demand ischemia from A fib with rvr and chf in setting of cKD


Continue to closely monitor and treat CHF and HR











- Current Meds


Current Meds: 





                               Current Medications











Generic Name Dose Route Start Last Admin





  Trade Name Freq  PRN Reason Stop Dose Admin


 


Albuterol/Ipratropium  3 ml  10/19/22 09:02  10/19/22 11:35





  Ipratropium/Albuterol 3 Ml Neb  INH   3 ml





  Q4HR PRN   Administration





  Wheezing  


 


Albuterol/Ipratropium  3 ml  10/19/22 15:00  10/20/22 06:51





  Ipratropium/Albuterol 3 Ml Neb  INH   3 ml





  RTQID DIVYA   Administration


 


Allopurinol  100 mg  10/16/22 09:00  10/20/22 08:36





  Allopurinol 100 Mg Tablet  PO   100 mg





  DAILY DIVYA   Administration


 


Apixaban  2.5 mg  10/16/22 09:00  10/20/22 08:36





  Apixaban 2.5 Mg Tablet  PO   2.5 mg





  BID DIVYA   Administration


 


Magnesium Oxide  400 mg  10/17/22 14:00  10/20/22 08:35





  Magnesium Oxide 400 Mg Tablet  PO   400 mg





  DAILYWM DIVYA   Administration


 


Metoprolol Succinate  37.5 mg  10/20/22 09:00  10/20/22 08:36





  Metoprolol Succinate 25 Mg Tablet  PO   37.5 mg





  BID DIVYA   Administration


 


Nicotine  1 patch  10/19/22 12:00  10/20/22 08:37





  Nicotine 21 Mg Patch  TOP   1 patch





  DAILY DVIYA   Administration


 


Oxycodone HCl  5 mg  10/16/22 16:03  10/17/22 12:28





  Oxycodone 10 Mg/0.5 Ml Syringe  PO   5 mg





  Q4HR PRN   Administration





  Shortness of Air/Wheezing  


 


Pantoprazole Sodium  40 mg  10/16/22 07:00  10/20/22 06:30





  Pantoprazole 40 Mg Tablet  PO   40 mg





  QDAC DIVYA   Administration


 


Potassium Chloride  20 meq  10/20/22 09:00  10/20/22 08:37





  Potassium Chloride 10 Meq Capsule  PO   20 meq





  0900,1200 DIVYA   Administration


 


Sodium Chloride  10 ml  10/16/22 01:00  10/20/22 08:37





  Sodium Chloride Flush 0.9% 10 Ml Syringe  IVP   Not Given





  0100,0900,1700 DIVYA  


 


Sodium Chloride  10 ml  10/16/22 00:26  10/17/22 09:21





  Sodium Chloride Flush 0.9% 10 Ml Syringe  IVP   10 ml





  PRN PRN   Administration





  AS NEEDED PER PROVIDER ORDERS  


 


Spironolactone  25 mg  10/20/22 09:00  10/20/22 08:37





  Spironolactone 25 Mg Tablet  PO   25 mg





  DAILY DIVYA   Administration














- Lab Result


Fish Bone Diagrams: 


                                 10/21/22 05:13





                                 10/21/22 05:13





- Additional Planning


My Orders: 





My Active Orders





10/19/22 11:37


IV DC [IV Discontinuation] [RC] .ONCE 


Telemetry-Discontinue [RC] .ONCE 





10/19/22 12:00


Nicotine 21 mg Patch [Nicoderm]   1 patch TOP DAILY 





10/19/22 15:00


Ipratropium/Albuterol [Duoneb]   3 ml INH RTQID 





10/20/22 09:00


Metoprolol Succinate [Toprol Xl]   37.5 mg PO BID 


Potassium Chloride [Micro-K]   20 meq PO 0900,1200 


Spironolactone [Aldactone]   25 mg PO DAILY 





10/20/22 21:00


lisinopriL [Zestril]   5 mg PO HS 














Subjective





- Subjective


Patient Reports: Feeling Better, Cough


Nursing Reports: Other (More alert, speaking more comfortably, is much less 

confused today)





Objective


Vital Signs: 





                               Vital Signs - 24 hr











  10/19/22 10/19/22 10/19/22





  11:07 11:35 15:30


 


Temperature 36.6 C  


 


Heart Rate  91 101 H


 


Heart Rate [   





Apical]   


 


Heart Rate [ 101 H  





Brachial]   


 


Heart Rate [   





Radial]   


 


Respiratory 20 18 20





Rate   


 


Blood Pressure 111/83 H  





[Left Brachial   





artery]   


 


Blood Pressure   





[Right Brachial   





artery]   


 


O2 Saturation 100  


 


If not protocol 2 2 2





: Oxygen Flow,   





liters/minute   














  10/19/22 10/19/22 10/19/22





  15:54 16:17 18:16


 


Temperature 37.0 C  


 


Heart Rate   56 L


 


Heart Rate [   





Apical]   


 


Heart Rate [ 49 L  





Brachial]   


 


Heart Rate [  57 L 





Radial]   


 


Respiratory 22  16





Rate   


 


Blood Pressure 101/73  





[Left Brachial   





artery]   


 


Blood Pressure   





[Right Brachial   





artery]   


 


O2 Saturation 97  


 


If not protocol 1  1





: Oxygen Flow,   





liters/minute   














  10/19/22 10/19/22 10/19/22





  19:28 20:44 23:26


 


Temperature 36.8 C 36.8 C 


 


Heart Rate   


 


Heart Rate [   





Apical]   


 


Heart Rate [ 73 112 H 





Brachial]   


 


Heart Rate [   





Radial]   


 


Respiratory 20 20 





Rate   


 


Blood Pressure 109/72  





[Left Brachial   





artery]   


 


Blood Pressure  100/64 





[Right Brachial   





artery]   


 


O2 Saturation 95 93 


 


If not protocol 1 1 1





: Oxygen Flow,   





liters/minute   














  10/20/22 10/20/22 10/20/22





  01:00 04:46 06:52


 


Temperature 36.5 C 36.5 C 


 


Heart Rate   80


 


Heart Rate [ 105 H  





Apical]   


 


Heart Rate [  103 H 





Brachial]   


 


Heart Rate [   





Radial]   


 


Respiratory 22 20 16





Rate   


 


Blood Pressure   





[Left Brachial   





artery]   


 


Blood Pressure 96/62 106/61 





[Right Brachial   





artery]   


 


O2 Saturation 96  


 


If not protocol 1 1 1





: Oxygen Flow,   





liters/minute   














  10/20/22





  07:28


 


Temperature 36.4 C L


 


Heart Rate 


 


Heart Rate [ 





Apical] 


 


Heart Rate [ 105 H





Brachial] 


 


Heart Rate [ 





Radial] 


 


Respiratory 20





Rate 


 


Blood Pressure 





[Left Brachial 





artery] 


 


Blood Pressure 107/65





[Right Brachial 





artery] 


 


O2 Saturation 95


 


If not protocol 1





: Oxygen Flow, 





liters/minute 








                                     Oxygen











O2 Source                      Nasal cannula


 


Oxygen Flow Rate               3














I&O (Last 24 Hrs): 





                          Intake and Output Totals x24h











 10/18/22 10/19/22 10/20/22





 23:59 23:59 23:59


 


Intake Total 1340 2442 340


 


Output Total 1170 101 


 


Balance 170 2341 340











General: Alert, Oriented x3, Other (Cachectic, frequent dry cough)


HEENT: Mucous membr. moist/pink


Neck: Supple, No JVD (in vertical position)


Neuro: Alert, Other ((has foot drop bilat, per PT))


Cardiovascular: No murmurs, Other (irreg irreg)


Respiratory: Rhonchi (poor air mvm, no further wheezing)


Abdomen: Soft, No tenderness


Extremities: Other (3+ edema to mid-thighs, shins are less tense, wearing VANESA 

hose)





- Results


Results: 





                               Laboratory Results











WBC  5.2 x10^3/uL (4.8-10.8)   10/20/22  04:33    


 


RBC  3.64 10^6/uL (4.70-6.10)  L  10/20/22  04:33    


 


Hgb  11.1 g/dL (14.0-18.0)  L  10/20/22  04:33    


 


Hct  35.9 % (42.0-52.0)  L  10/20/22  04:33    


 


MCV  98.6 fL (80.0-94.0)  H  10/20/22  04:33    


 


MCH  30.5 pg (27.0-31.0)   10/20/22  04:33    


 


MCHC  30.9 g/dL (32.0-36.0)  L  10/20/22  04:33    


 


RDW  14.6 % (12.0-15.0)   10/20/22  04:33    


 


Plt Count  177 10^3/uL (130-450)   10/20/22  04:33    


 


MPV  11.3 fL (7.4-11.4)   10/20/22  04:33    


 


Neut # (Auto)  3.3 10^3/uL (1.5-6.6)   10/20/22  04:33    


 


Lymph # (Auto)  1.1 10^3/uL (1.5-3.5)  L  10/20/22  04:33    


 


Mono # (Auto)  0.7 10^3/uL (0.0-1.0)   10/20/22  04:33    


 


Eos # (Auto)  0.2 10^3/uL (0.0-0.7)   10/20/22  04:33    


 


Baso # (Auto)  0.0 10^3/uL (0.0-0.1)   10/20/22  04:33    


 


Absolute Nucleated RBC  0.00 x10^3/uL  10/20/22  04:33    


 


Nucleated RBC %  0.0 /100WBC  10/20/22  04:33    


 


Sodium  138 mmol/L (135-145)   10/20/22  04:33    


 


Potassium  3.0 mmol/L (3.5-5.0)  L  10/20/22  04:33    


 


Chloride  92 mmol/L (101-111)  L  10/20/22  04:33    


 


Carbon Dioxide  33 mmol/L (21-32)  H  10/20/22  04:33    


 


Anion Gap  13.0  (6-13)   10/20/22  04:33    


 


BUN  61 mg/dL (6-20)  H  10/20/22  04:33    


 


Creatinine  2.2 mg/dL (0.6-1.2)  H  10/20/22  04:33    


 


Estimated GFR (MDRD)  29  (>89)  L  10/20/22  04:33    


 


Glucose  148 mg/dL ()  H  10/20/22  04:33    


 


POC Whole Bld Glucose  182 mg/dL (70 - 100)  H  10/18/22  11:15    


 


Estimat Average Glucose  134 mg/dL ()  H  10/16/22  05:17    


 


Hemoglobin A1c %  6.3 % (4.27-6.07)  H  10/16/22  05:17    


 


Lactic Acid  1.5 mmol/L (0.5-2.2)   10/15/22  21:56    


 


Calcium  9.3 mg/dL (8.5-10.3)   10/20/22  04:33    


 


Phosphorus  4.1 mg/dL (2.5-4.6)   10/16/22  05:17    


 


Magnesium  1.7 mg/dL (1.7-2.8)   10/19/22  05:33    


 


Total Bilirubin  1.8 mg/dL (0.2-1.0)  H  10/16/22  05:17    


 


AST  44 IU/L (10-42)  H  10/16/22  05:17    


 


ALT  27 IU/L (10-60)   10/16/22  05:17    


 


Alkaline Phosphatase  262 IU/L ()  H  10/16/22  05:17    


 


Troponin I High Sens  87.8 ng/L (2.3-19.7)  H*  10/16/22  05:17    


 


B-Natriuretic Peptide  3543 pg/mL (5-100)  H  10/15/22  21:56    


 


Total Protein  6.5 g/dL (6.7-8.2)  L  10/16/22  05:17    


 


Albumin  3.3 g/dL (3.2-5.5)   10/16/22  05:17    


 


Globulin  3.2 g/dL (2.1-4.2)   10/16/22  05:17    


 


Albumin/Globulin Ratio  1.0  (1.0-2.2)   10/16/22  05:17    


 


Triglycerides  48 mg/dL (-149)  10/16/22  01:00    


 


Cholesterol  84 mg/dL (-199)  10/16/22  01:00    


 


LDL Cholesterol, Calc  44 mg/dL (-129)  10/16/22  01:00    


 


VLDL Cholesterol  10 mg/dL  10/16/22  01:00    


 


HDL Cholesterol  30 mg/dL (60-) L  10/16/22  01:00    


 


LDL/HDL Ratio  1.5  (<3.6)   10/16/22  01:00    


 


Cholesterol/HDL Ratio  2.8  (<5.0)   10/16/22  01:00    


 


Lipase  34 U/L (22-51)   10/15/22  21:56    


 


TSH  < 0.08 uIU/mL (0.34-5.60)  L  10/16/22  05:17    


 


SARS-CoV-2 (PCR)  NOT DETECTED   10/15/22  21:53    














- Procedures


Procedures: 





Procedures





INTRODUCTION OF ANTI-INFLAM INTO PERIPH VEIN, PERC APPROACH (05/09/22)











Sepsis Event Note (H)





- Evaluation


Current Stage of Sepsis: Ruled out

## 2022-10-21 VITALS — DIASTOLIC BLOOD PRESSURE: 78 MMHG | SYSTOLIC BLOOD PRESSURE: 109 MMHG

## 2022-10-21 LAB
ANION GAP SERPL CALCULATED.4IONS-SCNC: 12 MMOL/L (ref 6–13)
BASOPHILS NFR BLD AUTO: 0 10^3/UL (ref 0–0.1)
BASOPHILS NFR BLD AUTO: 0.5 %
BUN SERPL-MCNC: 68 MG/DL (ref 6–20)
CALCIUM UR-MCNC: 9.1 MG/DL (ref 8.5–10.3)
CHLORIDE SERPL-SCNC: 94 MMOL/L (ref 101–111)
CO2 SERPL-SCNC: 34 MMOL/L (ref 21–32)
CREAT SERPLBLD-SCNC: 2 MG/DL (ref 0.6–1.2)
EOSINOPHIL # BLD AUTO: 0.1 10^3/UL (ref 0–0.7)
EOSINOPHIL NFR BLD AUTO: 2.2 %
ERYTHROCYTE [DISTWIDTH] IN BLOOD BY AUTOMATED COUNT: 14.7 % (ref 12–15)
GFRSERPLBLD MDRD-ARVRAT: 32 ML/MIN/{1.73_M2} (ref 89–?)
GLUCOSE SERPL-MCNC: 130 MG/DL (ref 70–100)
HCT VFR BLD AUTO: 35.4 % (ref 42–52)
HGB UR QL STRIP: 11 G/DL (ref 14–18)
LYMPHOCYTES # SPEC AUTO: 0.9 10^3/UL (ref 1.5–3.5)
LYMPHOCYTES NFR BLD AUTO: 16.8 %
MCH RBC QN AUTO: 31 PG (ref 27–31)
MCHC RBC AUTO-ENTMCNC: 31.1 G/DL (ref 32–36)
MCV RBC AUTO: 99.7 FL (ref 80–94)
MONOCYTES # BLD AUTO: 0.7 10^3/UL (ref 0–1)
MONOCYTES NFR BLD AUTO: 13.2 %
NEUTROPHILS # BLD AUTO: 3.7 10^3/UL (ref 1.5–6.6)
NEUTROPHILS # SNV AUTO: 5.5 X10^3/UL (ref 4.8–10.8)
NEUTROPHILS NFR BLD AUTO: 66.9 %
NRBC # BLD AUTO: 0 /100WBC
NRBC # BLD AUTO: 0 X10^3/UL
PDW BLD AUTO: 11.6 FL (ref 7.4–11.4)
PLATELET # BLD: 184 10^3/UL (ref 130–450)
POTASSIUM SERPL-SCNC: 3.6 MMOL/L (ref 3.5–5)
RBC MAR: 3.55 10^6/UL (ref 4.7–6.1)
SODIUM SERPLBLD-SCNC: 140 MMOL/L (ref 135–145)

## 2022-10-21 RX ADMIN — IPRATROPIUM BROMIDE AND ALBUTEROL SULFATE SCH ML: 2.5; .5 SOLUTION RESPIRATORY (INHALATION) at 10:38

## 2022-10-21 RX ADMIN — METOPROLOL SUCCINATE SCH MG: 25 TABLET, EXTENDED RELEASE ORAL at 08:37

## 2022-10-21 RX ADMIN — POTASSIUM CHLORIDE SCH MEQ: 750 CAPSULE, EXTENDED RELEASE ORAL at 13:08

## 2022-10-21 RX ADMIN — NICOTINE SCH PATCH: 21 PATCH TRANSDERMAL at 08:37

## 2022-10-21 RX ADMIN — POTASSIUM CHLORIDE SCH MEQ: 750 CAPSULE, EXTENDED RELEASE ORAL at 08:37

## 2022-10-21 RX ADMIN — SODIUM CHLORIDE, PRESERVATIVE FREE SCH: 5 INJECTION INTRAVENOUS at 01:25

## 2022-10-21 RX ADMIN — APIXABAN SCH MG: 2.5 TABLET, FILM COATED ORAL at 08:38

## 2022-10-21 RX ADMIN — SPIRONOLACTONE SCH MG: 25 TABLET, FILM COATED ORAL at 08:37

## 2022-10-21 RX ADMIN — SODIUM CHLORIDE, PRESERVATIVE FREE SCH ML: 5 INJECTION INTRAVENOUS at 08:38

## 2022-10-21 RX ADMIN — PANTOPRAZOLE SODIUM SCH MG: 40 TABLET, DELAYED RELEASE ORAL at 06:22

## 2022-10-21 RX ADMIN — Medication SCH MG: at 08:38

## 2022-10-21 RX ADMIN — IPRATROPIUM BROMIDE AND ALBUTEROL SULFATE SCH ML: 2.5; .5 SOLUTION RESPIRATORY (INHALATION) at 07:41

## 2022-10-21 NOTE — DISCHARGE PLAN
Discharge Plan


Problem Reviewed?: Yes


Disposition: 06 Home Health Service


Condition: Fair


Prescriptions: 


Spironolactone [Aldactone] 25 mg PO DAILY #30 tab


Furosemide [Lasix] 40 mg PO UD #15 tablet


Nicotine 21 mg Patch [Nicoderm] 1 patch TOP DAILY #10 patch


Multivitamin W/Minerals [Theragran  M] 1 tab PO DAILYWM #30 tab


lisinopriL [Zestril] 5 mg PO HS #30 tab


Diet: Regular


Activity Restrictions: Activity as Tolerated


Shower Restrictions: No


Driving Restrictions: Yes


Assistance Devices: Walker


Weight Bearing: Full Weight


Health Concerns: 


You were hospitalized because of having severe shortness of breath from fluid in

 your chest, worsening of your emphysema/COPD and your heart rate was too rapid 

to be safe.  Medications were adjusted.  You needed supplemental oxygen while in

 the hospital.  





We tested you for needing home oxygen and you do need oxygen.  It will be 

ordered with a respiratory company that we will deliver oxygen to your home.  

The concentrator should be set at 3 L/min of O2 at rest, and increase it to 4 

L/min with any activity. DO NOT SMOKE AROUND THE OXYGEN, in fact you should stop

 smoking because it has damaged your lungs and caused emphysema.





Please follow the new list of medications, there have been some changes (like 

the Furosemid is just every Mon, Wed, Fri now because there is now a second 

water pill). Several new prescriptions have been ordered and electronically sent

 to your [facility] pharmacy in [place].





When you are sitting in a chair, please elevate your feet on a pouf, or at least

 on several pillows, to help continue to drain water out of your legs.





Please see your primary care provider in the next 1 to 2 weeks for hospital 

follow-up visit.





To continue strengthening at home, a Home Health agency will provide you with 

in-house PT, OT, bath aide and check on your vital signs, oxygen level and if 

you need any other assistance, there will be a  coming out to your 

house to check with you and your wife.





Plan of Treatment: 


As above.





Care Goals: 


Improvement in symptoms and stabilization are the goals.





Assessment: 


Patient understands and is agreeable with the plan.





Additional Instructions or Follow Up instructions: 


If you have new or worsening symptoms, call your primary care provider for 

advice or come to the ER.





No Smoking: If you smoke, Please STOP!  Call [phone] for help.


Follow-up with: 


Portia Montalvo PA-C [Primary Care Provider] -

## 2022-10-21 NOTE — DISCHARGE SUMMARY
Discharge Summary


Admit Date: 10/16/22


Discharge Date: 10/21/22


Discharging Provider: Sinai Haynes MD


Primary Care Provider: Portia Montalvo NP


Code Status: Do Not Attempt Resuscitation


Condition at Discharge: Fair


Discharge Disposition: 06 Home Health Service





- [facility]


History of Present Illness: 





This is an 86-year-old white male with a history of 2 pack/day smoking since the

age of 16, COPD previously on home oxygen, sleep apnea on a CPAP machine (possi

[place] a trilogy machine), CAD with MI and coronary stenting in 2012, chronic 

atrial fib on Coumadin, PVD with renal artery stent of the right done 2009 and 

bilateral iliac stents, and has carotid blockage of 50 to 70%. He was diagnosed 

with diabetes mellitus about 5 years ago and was hospitalized here for altered 

mental status caused by HONK with glucose greater than 1000 in January 2017. He 

also has prostate disease, gout, hypertension, hyperlipidemia and status post 

cholecystectomy.


Patient comes in with complaints of worsening sob and worsening leg edema.   In 

the ED he was found to be in A. fib with RVR with rates in the 120s.  He 

received diltiazem IV once and heart rate improved to 105-110.   He is COVID-

negative   His BNP is 3500 with first troponin is 69 and CXR shows CHF.  He is 

in moderate distress. He lives with his wife served in the [employer] in his younger 

days, has 2 sons and many great grandkids, he is pleasant, expressed to be DNR 


Patient is SOB while talking. He also has 3-4+ leg edema and has b/l crackles as

well as diffuclty lying down flat, at this time his main issues are from CHF and

his COPD seems to be in control. Unfortunately not CPAP/Bipap available a this 

time, and patient cannot be transferred to higher level of care hospital due to 

logistics (no open beds), also his creatinine is 1.8. He is having no chest pain

and awaiting further labs, repeat troponin. 


Code status: DNR.








- HOSPITAL COURSE


Hospital Course: 





(1) Acute exacerbation of CHF (congestive heart failure)


 Qualified Code(s): I50.23 - Acute on chronic systolic (congestive) heart 

failure   


Echocardiogram was done and shows LVEF of <20% and 4-chamber dilatation. He has 

never had an Echo done here before to have a comparison, but based on his 

medications, this is new systolic heart failure. We treated him with IV 

diuretics then changed to oral. Watching his BUN/creat ratio which chasidy, 

signifying that we were drying out his intravascular space, but he still had 4+ 

leg edema. Leg elevation was ordered which he did begrudgingly. We continued his

beta-blockers and increased the dose for better rate control. We continue with 

his ACE inhibitor, but decreased the dose, to allow the increase in B-blocker 

dose. We started him on Spironolactone. He was discharged on these new meds and 

new doses.


He would need a pharmaceutical outpatient stress test, if he wants to pursue 

further treatment of his coronary artery disease from a more aggressive 

perspective, but we have no stress testing service available here whatsoever 

currently.





(2) Atrial fibrillation with RVR


He had high heart rates mostly with standing and walking. His B-blocker  dose 

was increased.He was on Cardizem at home which  we did not resume here, because 

of the (new) systolic heart failure. He was continued on his Eliquis at 2.5 bid 

as his anticoagulant for stroke prevention





(3) COPD with exacerbation


He does have known COPD and uses inhalers but continues to smoke. His exam was 

more consistent with COPD exacerbation than CHF. He was put on DuoNeb scheduled 

and additional prn DuoNeb. He was tested for needing a new home oxygen order:  

The patient was hypoxic at room air on rest, with O2 sats of 82%.  On 3 L/min 

oxygen at rest, his O2 was 91%.  With exertion on 3 L/min oxygen, his O2 sats 

were 86%.  And on 4 L/min with exertion, his O2 was 92%.  I am ordering home 

oxygen set at 3 L/min at rest and 4 L/min with exertion to treat his COPD.  I am

also ordering a nebulizer machine to administer bronchodilators to treat COPD. 

Nebulizer meds were ordered for home.  He was advised to stop smoking cigarettes

especially with oxygen in the house.  Nicotine patches were ordered for home.





(4) Leg edema


He had prsistent 4+ pitting edema to his thighs and said he "cannot elevate his 

legs". The Echo result showed that he has an dilated RV, therefore has Cor 

Pulmonale and mild pulm hypertension with PA pressure 54 mmHg. We ordered VANESA 

stocking andleg elevation. At the last admission in 5/2022, he was on Eliquis 

and he had leg doppler due to swelling, and DVt was ruled out. We did not repeat

a leg Doppler test.





(5) Hypokalemia


Related to IV diuresis. Replaced.





(6) CKD (chronic kidney disease)


His creatinine was 1.7 before admission, and was 1.8 at admission and increased 

with IV Lasix. Lasix was po for more gentle diuresis. His creat was 2.0 at 

discharge.





(7) Acute delirium


He would pull off his telemetry and pulled out his IV 4 times since admission, 

needed to be reminded to call for assistance when standing up. The wife had told

the last admission's provider that the patient has mild dementia.  She confirmed

that he has poor short-term memory,  but good long-term memory. A head CT was 

done and did showed atrophy. The wife had a suspicion that maybe he was fidgety 

because of nicotine withdrawal, since she reported that he still smokes 3 

packs/day. A nicotine patch was ordered.





(8) BPH (benign prostatic hyperplasia)


As per Hx. He needed to stand up to urinate.





(9) Tobacco abuse


He continues to smoke 2-3 PPD. We ordered nicotine patch and it was ordered at 

the time of discharge. He was counseled about the dangers of using home oxygen 

near a lit cigarette.





(10) Hx of CAD (coronary artery disease)


S/p PCI in 2012, details not known. Troponin's were flat. He would need a 

pharmaceutical outpatient stress test, if he wants to pursue further treatment 

of his coronary artery disease from a more aggressive perspective





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


A1c was 6.3%, indicating Borderline DM.  





(12) Hx Hypertension


Continued his B-clocker and lisinopril, now also on Lasix and Spironolactone.





(13) Troponin level elevated


Likely Type II MI, demand ischemia from A fib with rvr and chf in setting of cKD








- ALLERGIES


Allergies/Adverse Reactions: 


                                    Allergies











Allergy/AdvReac Type Severity Reaction Status Date / Time


 


ibuprofen Allergy  Hives Verified 10/15/22 21:37














- MEDICATIONS


Home Medications: 


                                Ambulatory Orders











 Medication  Instructions  Recorded  Confirmed


 


allopurinoL [Allopurinol] 300 mg PO DAILY 01/25/17 10/15/22


 


Apixaban [Eliquis] 2.5 mg PO BID 05/10/22 10/15/22


 


Metoprolol Succinate [Toprol Xl] 25 mg PO BID #60 tablet 05/12/22 10/15/22


 


Albuterol 2.5 mg INH Q4H PRN 10/15/22 10/15/22


 


Rosuvastatin Calcium [Crestor] 20 mg PO 10/17/22 


 


Tiotropium Bromide [Spiriva 1 puffs INH DAILY 10/17/22 10/17/22





Handihaler]   


 


diltiaZEM CD [Cardizem Cd] 120 mg PO ONCE 10/17/22 10/17/22


 


Furosemide [Lasix] 40 mg PO UD #15 tablet 10/21/22 


 


Ipratropium/Albuterol [Duoneb] 3 ml INH QID #120 ea 10/21/22 


 


Montelukast [Singulair] 10 mg PO QPM #30 tablet 10/21/22 


 


Multivitamin W/Minerals [Theragran 1 tab PO DAILYWM #30 tab 10/21/22 





M]   


 


Nicotine 21 mg Patch [Nicoderm] 1 patch TOP DAILY #10 patch 10/21/22 


 


Spironolactone [Aldactone] 25 mg PO DAILY #30 tab 10/21/22 


 


lisinopriL [Zestril] 5 mg PO HS #30 tab 10/21/22 














- PHYSICAL EXAM AT DISCHARGE


General Appearance: positive: No acute distress, Alert, Other (Thin, tall, 

elderly male.)


Eyes Bilateral: positive: Normal inspection, EOMI


ENT: positive: ENT inspection nml, No signs of dehydration


Neck: positive: Nml inspection, No JVD


Respiratory: positive: Breath sounds nml


Cardiovascular: positive: Regular rate & rhythm, No murmur


Abdomen: positive: Non-tender, Nml bowel sounds, No distention


Skin: positive: Warm, Dry


Extremities: positive: Non-tender, Other (3+ edema to above knees)


Neurologic/Psychiatric: positive: Oriented x3, Other (Poor short-term memory.)





- LABS


Result Diagrams: 


                                 10/21/22 05:13





                                 10/21/22 05:13





- DIAGNOSTIC IMAGING


Diagnostic Imaging Results: Final report reviewed





- SEPSIS


Current Stage of Sepsis: Ruled out





- FOLLOW UP


Follow Up: 





See PCP in 1-2 weeks for a hospital follow-up exam.








- TIME SPENT


Time Spent in Discharge (Minutes): 45

## 2022-11-19 ENCOUNTER — HOSPITAL ENCOUNTER (OUTPATIENT)
Dept: HOSPITAL 76 - EMS | Age: 87
End: 2022-11-19
Payer: MEDICARE

## 2022-11-19 DIAGNOSIS — I46.9: Primary | ICD-10-CM

## 2023-09-23 NOTE — HISTORY & PHYSICAL EXAMINATION
Chief Complaint





- Chief Complaint


Chief Complaint: SOB





History of Present Illness





- Admitted From


Admitted From:: ED





- History Obtained From


History obtained from: Info from EMS to ED provider, chart review, the patient 

is poor historian





- History of Present Illness


HPI Comment/Other: 





This is an 86-year-old white male with a history of 2 pack/day smoking since the

age of 16, COPD on home oxygen, sleep apnea on a CPAP machine (possibly a 

trilogy machine), CAD with MI and coronary stenting in , chronic atrial fib 

on Coumadin, PVD with renal artery stent in the right done  and bilateral 

iliac stents, and has carotid blockage of 50 to 70%. He was diagnosed with d

iabetes mellitus about 5 years ago and was hospitalized here foraltered mental 

status caused by HONK with glucose greater than 1000 in 2017. He also 

has prostate disease, gout, hypertension, hyperlipidemia and status post 

cholecystectomy.


An ambulance was called due to shortness of breath and the patient gave a vague 

history in the ED that he has been short of breath for days and that "the girls 

made him come in to the ER". He described a cough with minimal sputum 

production, denies a fever.  In the ED he was found to be in A. fib with RVR 

with rates in the 140s.  He received diltiazem IV twice and heart rate improved 

to 105-110.  His exam showed left basilar rales and chest x-ray showed a left 

lower lobe pneumonia. He is COVID-positive.  His BNP is 1900 with first troponin

is 47.  Exam also showed right leg swelling and he underwent a Doppler of that 

leg that showed no DVT. A CTA of the chest has been done but results are still 

pending.  The ED provider reached out to have the patient admitted to the 

Hospitalist team for managing his A. fib with RVR, community-acquired pneumonia,

COPD, posdsibly CHF and a COVID-pneumonia.


He has a POLST signed in  that indicates he wants DNR, DNI and selective 

medical treatment, preferably no ICU and preferably no transfers.








History





- Past Medical History


Cardiovascular: reports: Congestive heart failure, Hypertension, Coronary artery

disease, Peripheral Vascular Disease, MI, Atrial fibrillation


Respiratory: reports: COPD, Sleep apnea, CPAP use


Endocrine/Autoimmune: reports: Type 2 diabetes


GI: reports: GERD, Ulcers, Hemorrhoids


: reports: Incontinence


HEENT: reports: Chronic hearing loss


Psych: reports: None


Musculoskeletal: reports: Other


Derm: reports: None


MRSA Hx?: Yes





- Past Surgical History


General: reports: Cholecystectomy, Other


Cardiovascular: reports: Coronary stent





- Family & Social History


Family History: Father:  ( at 50 of stomach cancer, dad  at 48 

of a car accident, 1 brother unknown status), Sister: Alive and Well (2 sisters 

have diabetes)


Living arrangement: At home


Living Situation: With spouse/s.o.





- Substance History


Dependence: Experiences withdrawal or developed tolerances: Tobacco


Tobacco Details: Cigarettes





- POLST


Patient has POLST: Yes


POLST Status: DNR





Meds/Allgy





- Home Medications


Home Medications: 


                                Ambulatory Orders











 Medication  Instructions  Recorded  Confirmed


 


Colchicine [Colcrys] 0.6 mg PO DAILY 17


 


Digoxin [Digox] 125 mcg PO DAILY 17


 


Doxazosin [Cardura] 4 mg PO DAILY 17


 


Esomeprazole Magnesium [Nexium] 20 mg PO DAILY 17


 


Furosemide 40 mg PO SUMOWETHSA 17


 


Metoprolol Succinate [Toprol Xl] 25 mg PO DAILY 17


 


Potassium Chloride [Klor-Con M20] 20 meq PO DAILY 17


 


Simvastatin 40 mg PO DAILY 17


 


Warfarin Sodium 5 mg PO MOTUWEFRSA 17


 


allopurinoL [Allopurinol] 300 mg PO DAILY 17


 


lisinopriL [Zestril] 20 mg PO DAILY 17


 


Furosemide [Lasix] 80 mg PO TUTH 17


 


Warfarin Sodium 7.5 mg PO SUTH 17


 


Blood Sugar Diagnostic [[facility] strip 17 





Strips]   


 


Blood-Glucose Meter [Glucometer] [facility] kit 17 


 


Insulin Aspart [NovoLOG] 5 unit SUBQ TIDWM #2 pen 17 


 


Insulin Glargine [Lantus Solostar] 25 unit SUBQ QDBREAKFAST #2 pen 17 


 


Needles, Disposable [Easy Touch [facility] dis.needle 17 





Hypodermic Needle]   














- Allergies


Allergies/Adverse Reactions: 


                                    Allergies











Allergy/AdvReac Type Severity Reaction Status Date / Time


 


ibuprofen Allergy  Hives Verified 22 18:20














Review of Systems





- Respiratory


Respiratory: reports: Cough, Sputum production, SOB at rest, SOB with exertion





- All Other Systems


All Other Systems: reports: Reviewed and negative (He is a poor historian and 

gives very few details about any symptoms.)





Exam





- Vital Signs


Reviewed Vital Signs: Yes


Vital Signs: 





                                Vital Signs x48h











  Temp Pulse Resp BP Pulse Ox


 


 22 21:42     149/91 H 


 


 22 19:30   106 H  24  


 


 22 19:12   107 H  28 H  160/104 H  92


 


 22 18:20   111 H  27 H  160/104 H  93


 


 22 18:11  37.3 C  119 H  28 H  151/105 H  93














- Physical Exam


General Appearance: positive: Alert (exam done remotely), Mild distress (wearing

O2 per n.c.)


Eyes Bilateral: positive: Normal inspection


ENT: positive: No signs of dehydration


Neck: positive: Nml inspection


Respiratory: positive: Other (prolonged exp phase)


Cardiovascular: positive: Irregularly irregular


Abdomen: positive: No distention


Skin: positive: Warm, Dry


Extremities: positive: Other (R leg swelling)


Neurologic/Psychiatric: positive: Other (oriented to self and location, poor 

memory)





Conclusion/Plan





- Problem List


(1) Atrial fibrillation with RVR


Conclusion/Plan: 


The heart rate is likely elevated due to his infection (pneumonia and COVID 

infection).


Will admit patient to the ICU to start an IV diltiazem drip for rate control.


Will cycle troponins to rule out an MI causing this RVR.


Check a.m. TSH to rule out hyperthyroidism as a cause for the RVR


Will continue with his daily B-blocker and his Coumadin.  Follow INR daily.








(2) CAP (community acquired pneumonia)


Conclusion/Plan: 


Will continue with supplemental oxygen as needed keeping saturations greater 

than 80%.


Will treat empirically with IV ceftriaxone and oral Zithromax (to decrease 

fluids). Start Florastor.


Blood cultures have already been drawn before starting antibiotics, rate the 

results.


Obtain a sputum culture if he makes it.


Will start Mucinex for pulmonary toilet








(3) COVID-19


Conclusion/Plan: 


Will order respiratory and droplet isolation.


Will start Decadron iv or po daily.


Will order Remdesivir to start tomorrow by Pharmacy


Provide supplemental oxygen to keep sats greater than 88%








(4) COPD exacerbation


Conclusion/Plan: 


Will order IV Decadron, nebulized bronchodilators and steroids, Mucinex for 

pulmonary toilet and supplemental oxygen








(5) Sleep apnea


Conclusion/Plan: 


Will order his home device to be used here








(6) Diabetes


Conclusion/Plan: 


Will order a carb controlled diet, fingerstick glucose checks and sliding scale 

for insulin coverage.


Will order a slightly lower dose than his usual long-acting insulin, 20 units 

instead of 25 units, since he may have decreased intake given the COVID and need

for CPAP machine


We will check HbA1c with morning labs


Qualifiers: 


   Diabetes mellitus type: type 2 





(7) TABITHA (acute kidney injury)


Conclusion/Plan: 


His last creatinine here was many years ago and was not 1.1, today it is 1.6.


This may be due to his loop diuretic use or generalized volume depletion from 

his infection.


Avoid nephrotoxins.


Follow BMP daily.


Will continue with his usual Lasix oral treatment however to prevent going into 

florid CHF








(8) PVD (peripheral vascular disease)


Conclusion/Plan: 


We will continue with his statin and Coumadin treatment and promote cigarette 

smoking cessation








(9) Hypertension


Conclusion/Plan: 


IV diltiazem drip will help with blood pressure control and will continue with 

his usual oral Lasix in order to prevent going into florid CHF.  We will 

continue his Zestril, metoprolol and Cardura as well








(10) Hx of coronary artery disease


Conclusion/Plan: 


Will continue his B-blocker post-MI and his statin.


Will check sequential troponins to R/O MI as the cause of this SOB and the very 

elevated BNP.


Will continue his Lasix doses because of the high BNP.


Follow BNP daily.


An Echo would be helpful to exclude systolic heart failure, but we currently 

have no Echo service whatsoever available here.








(11) Tobacco abuse


Conclusion/Plan: 


The patient told the ED provider that he has decreased smoking from 2 packs/day 

down to 1/2/pack/day recently.


Will order a nicotine patch while here.








(12) Hypokalemia


Conclusion/Plan: 


Likely from his loop diuretic use.


Will replace potassium via IV.


Follow BMP daily








(13) Hypomagnesemia


Conclusion/Plan: 


Likely from his loop diuretic use.


Will replace magnesium orally.


Follow serum magnesium intermittently








(14) BPH (benign prostatic hyperplasia)


Conclusion/Plan: 


Will continue his home Cardura for this








- Lab Results


Fish Bones: 


                                 22 18:26





                                 22 18:26





- Diagnostic Imaging Results


Diagnostic Imaging Results: positive: Final report reviewed 74.76